# Patient Record
Sex: MALE | Race: WHITE | Employment: OTHER | URBAN - METROPOLITAN AREA
[De-identification: names, ages, dates, MRNs, and addresses within clinical notes are randomized per-mention and may not be internally consistent; named-entity substitution may affect disease eponyms.]

---

## 2017-01-17 RX ORDER — LABETALOL 100 MG/1
TABLET, FILM COATED ORAL
Qty: 180 TABLET | Refills: 3 | Status: SHIPPED | OUTPATIENT
Start: 2017-01-17 | End: 2017-11-03

## 2017-02-27 RX ORDER — AMLODIPINE BESYLATE 10 MG/1
TABLET ORAL
Qty: 90 TABLET | Refills: 3 | Status: SHIPPED | OUTPATIENT
Start: 2017-02-27 | End: 2017-11-03

## 2017-03-27 ENCOUNTER — OFFICE VISIT (OUTPATIENT)
Dept: INTERNAL MEDICINE CLINIC | Facility: CLINIC | Age: 67
End: 2017-03-27

## 2017-03-27 ENCOUNTER — LAB ENCOUNTER (OUTPATIENT)
Dept: LAB | Age: 67
End: 2017-03-27
Attending: INTERNAL MEDICINE
Payer: MEDICARE

## 2017-03-27 VITALS
WEIGHT: 230.81 LBS | BODY MASS INDEX: 34 KG/M2 | OXYGEN SATURATION: 98 % | SYSTOLIC BLOOD PRESSURE: 140 MMHG | HEART RATE: 78 BPM | DIASTOLIC BLOOD PRESSURE: 80 MMHG | TEMPERATURE: 98 F

## 2017-03-27 DIAGNOSIS — I10 HYPERTENSION, BENIGN: ICD-10-CM

## 2017-03-27 DIAGNOSIS — C61 MALIGNANT NEOPLASM OF PROSTATE (HCC): ICD-10-CM

## 2017-03-27 DIAGNOSIS — N20.0 RENAL STONE: ICD-10-CM

## 2017-03-27 DIAGNOSIS — C67.0 MALIGNANT NEOPLASM OF TRIGONE OF URINARY BLADDER (HCC): ICD-10-CM

## 2017-03-27 DIAGNOSIS — N20.0 RENAL STONE: Primary | ICD-10-CM

## 2017-03-27 LAB
ALBUMIN SERPL BCP-MCNC: 3.5 G/DL (ref 3.5–4.8)
ALBUMIN/GLOB SERPL: 1.1 {RATIO} (ref 1–2)
ALP SERPL-CCNC: 81 U/L (ref 32–100)
ALT SERPL-CCNC: 20 U/L (ref 17–63)
ANION GAP SERPL CALC-SCNC: 7 MMOL/L (ref 0–18)
AST SERPL-CCNC: 17 U/L (ref 15–41)
BASOPHILS # BLD: 0 K/UL (ref 0–0.2)
BASOPHILS NFR BLD: 0 %
BILIRUB SERPL-MCNC: 1.3 MG/DL (ref 0.3–1.2)
BUN SERPL-MCNC: 23 MG/DL (ref 8–20)
BUN/CREAT SERPL: 8 (ref 10–20)
CALCIUM SERPL-MCNC: 8.6 MG/DL (ref 8.5–10.5)
CHLORIDE SERPL-SCNC: 113 MMOL/L (ref 95–110)
CHOLEST SERPL-MCNC: 81 MG/DL (ref 110–200)
CO2 SERPL-SCNC: 19 MMOL/L (ref 22–32)
CREAT SERPL-MCNC: 2.87 MG/DL (ref 0.5–1.5)
EOSINOPHIL # BLD: 0.2 K/UL (ref 0–0.7)
EOSINOPHIL NFR BLD: 2 %
ERYTHROCYTE [DISTWIDTH] IN BLOOD BY AUTOMATED COUNT: 14.7 % (ref 11–15)
FERRITIN SERPL IA-MCNC: 19 NG/ML (ref 24–336)
GLOBULIN PLAS-MCNC: 3.3 G/DL (ref 2.5–3.7)
GLUCOSE SERPL-MCNC: 137 MG/DL (ref 70–99)
HCT VFR BLD AUTO: 38.1 % (ref 41–52)
HDLC SERPL-MCNC: 42 MG/DL
HGB BLD-MCNC: 12.4 G/DL (ref 13.5–17.5)
LDLC SERPL CALC-MCNC: 30 MG/DL (ref 0–99)
LYMPHOCYTES # BLD: 0.8 K/UL (ref 1–4)
LYMPHOCYTES NFR BLD: 10 %
MCH RBC QN AUTO: 27.6 PG (ref 27–32)
MCHC RBC AUTO-ENTMCNC: 32.6 G/DL (ref 32–37)
MCV RBC AUTO: 84.6 FL (ref 80–100)
MONOCYTES # BLD: 0.6 K/UL (ref 0–1)
MONOCYTES NFR BLD: 7 %
NEUTROPHILS # BLD AUTO: 6.4 K/UL (ref 1.8–7.7)
NEUTROPHILS NFR BLD: 81 %
NONHDLC SERPL-MCNC: 39 MG/DL
OSMOLALITY UR CALC.SUM OF ELEC: 294 MOSM/KG (ref 275–295)
PLATELET # BLD AUTO: 151 K/UL (ref 140–400)
PMV BLD AUTO: 9.3 FL (ref 7.4–10.3)
POTASSIUM SERPL-SCNC: 3.4 MMOL/L (ref 3.3–5.1)
PROT SERPL-MCNC: 6.8 G/DL (ref 5.9–8.4)
RBC # BLD AUTO: 4.51 M/UL (ref 4.5–5.9)
SODIUM SERPL-SCNC: 139 MMOL/L (ref 136–144)
TRIGL SERPL-MCNC: 47 MG/DL (ref 1–149)
TSH SERPL-ACNC: 2.02 UIU/ML (ref 0.34–5.6)
WBC # BLD AUTO: 7.9 K/UL (ref 4–11)

## 2017-03-27 PROCEDURE — 85025 COMPLETE CBC W/AUTO DIFF WBC: CPT

## 2017-03-27 PROCEDURE — 80061 LIPID PANEL: CPT

## 2017-03-27 PROCEDURE — G0463 HOSPITAL OUTPT CLINIC VISIT: HCPCS | Performed by: INTERNAL MEDICINE

## 2017-03-27 PROCEDURE — 84443 ASSAY THYROID STIM HORMONE: CPT

## 2017-03-27 PROCEDURE — 80053 COMPREHEN METABOLIC PANEL: CPT

## 2017-03-27 PROCEDURE — 36415 COLL VENOUS BLD VENIPUNCTURE: CPT

## 2017-03-27 PROCEDURE — 99214 OFFICE O/P EST MOD 30 MIN: CPT | Performed by: INTERNAL MEDICINE

## 2017-03-27 PROCEDURE — 82728 ASSAY OF FERRITIN: CPT

## 2017-03-27 NOTE — PROGRESS NOTES
Carola Summers is a 78year old male. HPI:   1. Renal stone -  He was at [facility] in Jan for re-ealuation and found to have an asymptomatic left renal stone which they extracted via nephrostomy several days.  He is doing well since that time and never did ha abdominal pain and denies heartburn  NEURO: denies headaches    EXAM:   /80 mmHg  Pulse 78  Temp(Src) 98.1 °F (36.7 °C) (Oral)  Wt 230 lb 12.8 oz (104.69 kg)  SpO2 98%  GENERAL: well developed, well nourished,in no apparent distress  SKIN: no rashes,

## 2017-03-30 ENCOUNTER — TELEPHONE (OUTPATIENT)
Dept: INTERNAL MEDICINE CLINIC | Facility: CLINIC | Age: 67
End: 2017-03-30

## 2017-03-30 NOTE — TELEPHONE ENCOUNTER
Blood count a little low iron low. Kidney function a little worse than prior - creatinine 2.8 was 2.4. He should not be taking any NSAIDs such as Advil, Aleve, Motrin, ibuprofen. Only take Tylenol for aches and pains.      Repeat CBC, Ferritin and BMP 1

## 2017-03-30 NOTE — TELEPHONE ENCOUNTER
RUBÉN to DR. BARONE - called patient and relayed DR. BARONE message - he verbalized understanding, but stated \" dave not going for blood work in a month- i know all about it \"

## 2017-09-07 ENCOUNTER — HOSPITAL ENCOUNTER (OUTPATIENT)
Dept: CT IMAGING | Facility: HOSPITAL | Age: 67
Discharge: HOME | End: 2017-09-07
Attending: UROLOGY
Payer: MEDICARE

## 2017-09-07 DIAGNOSIS — N20.0 KIDNEY STONE: ICD-10-CM

## 2017-09-07 PROCEDURE — 74176 CT ABD & PELVIS W/O CONTRAST: CPT | Performed by: UROLOGY

## 2017-09-26 ENCOUNTER — TELEPHONE (OUTPATIENT)
Dept: INTERNAL MEDICINE CLINIC | Facility: CLINIC | Age: 67
End: 2017-09-26

## 2017-09-26 DIAGNOSIS — D50.0 IRON DEFICIENCY ANEMIA DUE TO CHRONIC BLOOD LOSS: Primary | ICD-10-CM

## 2017-09-26 DIAGNOSIS — I10 BENIGN HYPERTENSION: ICD-10-CM

## 2017-09-26 NOTE — TELEPHONE ENCOUNTER
Last labs patient had done were CBC, CMP, Lipid, TSH, Ferritin on 3/27/17    To Dr Vickie Goodman do you want patient to do any labs before visit on December 6th?

## 2017-09-26 NOTE — TELEPHONE ENCOUNTER
Patient has appt to see Dr Tye Glass on Dec 6  Would like to have labs done prior to appt & requests order

## 2017-09-28 ENCOUNTER — TELEPHONE (OUTPATIENT)
Dept: SURGERY | Facility: CLINIC | Age: 67
End: 2017-09-28

## 2017-09-28 NOTE — TELEPHONE ENCOUNTER
Received progress notes from Dr. Travon Carson at [address] [place] [address]. Progress notes placed in scan bin.

## 2017-11-01 ENCOUNTER — LAB ENCOUNTER (OUTPATIENT)
Dept: LAB | Age: 67
End: 2017-11-01
Attending: INTERNAL MEDICINE
Payer: MEDICARE

## 2017-11-01 DIAGNOSIS — D50.0 IRON DEFICIENCY ANEMIA DUE TO CHRONIC BLOOD LOSS: ICD-10-CM

## 2017-11-01 DIAGNOSIS — I10 BENIGN HYPERTENSION: ICD-10-CM

## 2017-11-01 PROCEDURE — 80053 COMPREHEN METABOLIC PANEL: CPT

## 2017-11-01 PROCEDURE — 85025 COMPLETE CBC W/AUTO DIFF WBC: CPT

## 2017-11-01 PROCEDURE — 36415 COLL VENOUS BLD VENIPUNCTURE: CPT

## 2017-11-01 PROCEDURE — 82728 ASSAY OF FERRITIN: CPT

## 2017-11-03 ENCOUNTER — OFFICE VISIT (OUTPATIENT)
Dept: INTERNAL MEDICINE CLINIC | Facility: CLINIC | Age: 67
End: 2017-11-03

## 2017-11-03 VITALS
BODY MASS INDEX: 34.8 KG/M2 | OXYGEN SATURATION: 97 % | SYSTOLIC BLOOD PRESSURE: 124 MMHG | HEIGHT: 68.75 IN | DIASTOLIC BLOOD PRESSURE: 70 MMHG | HEART RATE: 71 BPM | WEIGHT: 235 LBS | TEMPERATURE: 99 F

## 2017-11-03 DIAGNOSIS — C61 MALIGNANT NEOPLASM OF PROSTATE (HCC): ICD-10-CM

## 2017-11-03 DIAGNOSIS — C67.0 MALIGNANT NEOPLASM OF TRIGONE OF URINARY BLADDER (HCC): ICD-10-CM

## 2017-11-03 DIAGNOSIS — I10 HYPERTENSION, BENIGN: Primary | ICD-10-CM

## 2017-11-03 DIAGNOSIS — N18.9 CHRONIC KIDNEY DISEASE, UNSPECIFIED CKD STAGE: ICD-10-CM

## 2017-11-03 PROCEDURE — 99214 OFFICE O/P EST MOD 30 MIN: CPT | Performed by: INTERNAL MEDICINE

## 2017-11-03 PROCEDURE — G0463 HOSPITAL OUTPT CLINIC VISIT: HCPCS | Performed by: INTERNAL MEDICINE

## 2017-11-03 RX ORDER — AMLODIPINE BESYLATE 10 MG/1
10 TABLET ORAL DAILY
Qty: 90 TABLET | Refills: 3 | Status: ON HOLD | OUTPATIENT
Start: 2017-11-03 | End: 2018-01-01

## 2017-11-03 RX ORDER — LABETALOL 100 MG/1
100 TABLET, FILM COATED ORAL 2 TIMES DAILY
Qty: 180 TABLET | Refills: 3 | Status: ON HOLD | OUTPATIENT
Start: 2017-11-03 | End: 2018-01-01

## 2017-11-03 RX ORDER — OMEPRAZOLE 20 MG/1
20 CAPSULE, DELAYED RELEASE ORAL EVERY MORNING
Qty: 90 CAPSULE | Refills: 3 | Status: ON HOLD | OUTPATIENT
Start: 2017-11-03 | End: 2018-01-01

## 2017-11-03 NOTE — PROGRESS NOTES
Derrick Caba is a 75year old male. HPI:   He is generally doing well and is stable but does have decreased energy as before not extreme - he pushes himself. He has \"no ambition\". SR: no chest pain or sob, no gu or gi sx.       1. Hypertension, joni Osteoarthritis of both knees     severe    • Unspecified essential hypertension       Social History:  Smoking status: Never Smoker                                                              Smokeless tobacco: Never Used                      Alcohol use:

## 2018-01-01 ENCOUNTER — OFFICE VISIT (OUTPATIENT)
Dept: HEMATOLOGY/ONCOLOGY | Facility: HOSPITAL | Age: 68
End: 2018-01-01
Attending: INTERNAL MEDICINE
Payer: MEDICARE

## 2018-01-01 ENCOUNTER — APPOINTMENT (OUTPATIENT)
Dept: CV DIAGNOSTICS | Facility: HOSPITAL | Age: 68
DRG: 314 | End: 2018-01-01
Attending: PHYSICIAN ASSISTANT
Payer: MEDICARE

## 2018-01-01 ENCOUNTER — HOSPITAL ENCOUNTER (INPATIENT)
Facility: HOSPITAL | Age: 68
LOS: 1 days | Discharge: ANOTHER HOSPITAL | DRG: 920 | End: 2018-01-01
Attending: EMERGENCY MEDICINE | Admitting: HOSPITALIST
Payer: MEDICARE

## 2018-01-01 ENCOUNTER — OFFICE VISIT (OUTPATIENT)
Dept: RADIATION ONCOLOGY | Facility: HOSPITAL | Age: 68
End: 2018-01-01
Attending: RADIOLOGY
Payer: MEDICARE

## 2018-01-01 ENCOUNTER — OFFICE VISIT (OUTPATIENT)
Dept: HEMATOLOGY/ONCOLOGY | Facility: HOSPITAL | Age: 68
End: 2018-01-01
Attending: PHYSICIAN ASSISTANT
Payer: MEDICARE

## 2018-01-01 ENCOUNTER — TELEPHONE (OUTPATIENT)
Dept: HEMATOLOGY/ONCOLOGY | Facility: HOSPITAL | Age: 68
End: 2018-01-01

## 2018-01-01 ENCOUNTER — TELEPHONE (OUTPATIENT)
Dept: SURGERY | Facility: CLINIC | Age: 68
End: 2018-01-01

## 2018-01-01 ENCOUNTER — APPOINTMENT (OUTPATIENT)
Dept: PICC SERVICES | Facility: HOSPITAL | Age: 68
DRG: 314 | End: 2018-01-01
Attending: PHYSICIAN ASSISTANT
Payer: MEDICARE

## 2018-01-01 ENCOUNTER — LAB ENCOUNTER (OUTPATIENT)
Dept: LAB | Age: 68
End: 2018-01-01
Attending: INTERNAL MEDICINE
Payer: MEDICARE

## 2018-01-01 ENCOUNTER — APPOINTMENT (OUTPATIENT)
Dept: GENERAL RADIOLOGY | Facility: HOSPITAL | Age: 68
DRG: 314 | End: 2018-01-01
Attending: INTERNAL MEDICINE
Payer: MEDICARE

## 2018-01-01 ENCOUNTER — HOSPITAL ENCOUNTER (EMERGENCY)
Facility: HOSPITAL | Age: 68
Discharge: HOME | End: 2018-01-01
Attending: EMERGENCY MEDICINE
Payer: MEDICARE

## 2018-01-01 ENCOUNTER — HOSPITAL ENCOUNTER (OUTPATIENT)
Dept: CT IMAGING | Facility: HOSPITAL | Age: 68
Discharge: HOME | End: 2018-01-01
Attending: ORTHOPAEDIC SURGERY
Payer: MEDICARE

## 2018-01-01 ENCOUNTER — OFFICE VISIT (OUTPATIENT)
Dept: RADIATION ONCOLOGY | Facility: HOSPITAL | Age: 68
End: 2018-01-01
Attending: INTERNAL MEDICINE
Payer: MEDICARE

## 2018-01-01 ENCOUNTER — APPOINTMENT (OUTPATIENT)
Dept: GENERAL RADIOLOGY | Facility: HOSPITAL | Age: 68
DRG: 314 | End: 2018-01-01
Attending: EMERGENCY MEDICINE
Payer: MEDICARE

## 2018-01-01 ENCOUNTER — HOSPITAL ENCOUNTER (INPATIENT)
Facility: HOSPITAL | Age: 68
LOS: 10 days | Discharge: HOME | DRG: 314 | End: 2018-01-01
Attending: EMERGENCY MEDICINE | Admitting: HOSPITALIST
Payer: MEDICARE

## 2018-01-01 ENCOUNTER — TELEPHONE (OUTPATIENT)
Dept: INTERNAL MEDICINE CLINIC | Facility: CLINIC | Age: 68
End: 2018-01-01

## 2018-01-01 ENCOUNTER — HOSPITAL ENCOUNTER (OUTPATIENT)
Dept: NUCLEAR MEDICINE | Facility: HOSPITAL | Age: 68
Discharge: HOME | DRG: 543 | End: 2018-01-01
Attending: ORTHOPAEDIC SURGERY
Payer: MEDICARE

## 2018-01-01 ENCOUNTER — NURSE ONLY (OUTPATIENT)
Dept: RADIATION ONCOLOGY | Facility: HOSPITAL | Age: 68
End: 2018-01-01

## 2018-01-01 ENCOUNTER — HOSPITAL ENCOUNTER (OUTPATIENT)
Facility: HOSPITAL | Age: 68
Setting detail: OBSERVATION
Discharge: HOME HEALTH | End: 2018-01-01
Attending: EMERGENCY MEDICINE | Admitting: HOSPITALIST
Payer: MEDICARE

## 2018-01-01 ENCOUNTER — TELEPHONE (OUTPATIENT)
Dept: PALLIATIVE CARE | Facility: HOSPITAL | Age: 68
End: 2018-01-01

## 2018-01-01 ENCOUNTER — APPOINTMENT (OUTPATIENT)
Dept: GENERAL RADIOLOGY | Facility: HOSPITAL | Age: 68
DRG: 543 | End: 2018-01-01
Payer: MEDICARE

## 2018-01-01 ENCOUNTER — APPOINTMENT (OUTPATIENT)
Dept: CT IMAGING | Facility: HOSPITAL | Age: 68
End: 2018-01-01
Attending: EMERGENCY MEDICINE
Payer: MEDICARE

## 2018-01-01 ENCOUNTER — APPOINTMENT (OUTPATIENT)
Dept: INTERVENTIONAL RADIOLOGY/VASCULAR | Facility: HOSPITAL | Age: 68
DRG: 314 | End: 2018-01-01
Attending: INTERNAL MEDICINE
Payer: MEDICARE

## 2018-01-01 ENCOUNTER — HOSPITAL ENCOUNTER (INPATIENT)
Facility: HOSPITAL | Age: 68
LOS: 2 days | Discharge: ANOTHER HOSPITAL | DRG: 543 | End: 2018-01-01
Attending: EMERGENCY MEDICINE | Admitting: HOSPITALIST
Payer: MEDICARE

## 2018-01-01 ENCOUNTER — HOSPITAL ENCOUNTER (INPATIENT)
Facility: HOSPITAL | Age: 68
LOS: 1 days | Discharge: HOME HEALTH | DRG: 812 | End: 2018-01-01
Attending: EMERGENCY MEDICINE | Admitting: HOSPITALIST
Payer: MEDICARE

## 2018-01-01 ENCOUNTER — APPOINTMENT (OUTPATIENT)
Dept: HEMATOLOGY/ONCOLOGY | Facility: HOSPITAL | Age: 68
End: 2018-01-01
Attending: NURSE PRACTITIONER
Payer: MEDICARE

## 2018-01-01 ENCOUNTER — OFFICE VISIT (OUTPATIENT)
Dept: INTERNAL MEDICINE CLINIC | Facility: CLINIC | Age: 68
End: 2018-01-01

## 2018-01-01 ENCOUNTER — HOSPITAL ENCOUNTER (OUTPATIENT)
Dept: GENERAL RADIOLOGY | Age: 68
Discharge: HOME | End: 2018-01-01
Attending: INTERNAL MEDICINE | Admitting: INTERNAL MEDICINE
Payer: MEDICARE

## 2018-01-01 ENCOUNTER — APPOINTMENT (OUTPATIENT)
Dept: INTERVENTIONAL RADIOLOGY/VASCULAR | Facility: HOSPITAL | Age: 68
DRG: 314 | End: 2018-01-01
Attending: PHYSICIAN ASSISTANT
Payer: MEDICARE

## 2018-01-01 ENCOUNTER — TELEPHONE (OUTPATIENT)
Dept: INTERNAL MEDICINE UNIT | Facility: HOSPITAL | Age: 68
End: 2018-01-01

## 2018-01-01 ENCOUNTER — APPOINTMENT (OUTPATIENT)
Dept: CT IMAGING | Facility: HOSPITAL | Age: 68
DRG: 314 | End: 2018-01-01
Attending: INTERNAL MEDICINE
Payer: MEDICARE

## 2018-01-01 ENCOUNTER — APPOINTMENT (OUTPATIENT)
Dept: CV DIAGNOSTICS | Facility: HOSPITAL | Age: 68
DRG: 314 | End: 2018-01-01
Attending: INTERNAL MEDICINE
Payer: MEDICARE

## 2018-01-01 ENCOUNTER — HOSPITAL ENCOUNTER (OUTPATIENT)
Dept: CT IMAGING | Facility: HOSPITAL | Age: 68
Discharge: HOME | End: 2018-01-01
Attending: PHYSICIAN ASSISTANT
Payer: MEDICARE

## 2018-01-01 ENCOUNTER — APPOINTMENT (OUTPATIENT)
Dept: GENERAL RADIOLOGY | Facility: HOSPITAL | Age: 68
End: 2018-01-01
Attending: EMERGENCY MEDICINE
Payer: MEDICARE

## 2018-01-01 ENCOUNTER — HOSPITAL ENCOUNTER (INPATIENT)
Facility: HOSPITAL | Age: 68
LOS: 2 days | Discharge: HOME | DRG: 683 | End: 2018-01-01
Attending: HOSPITALIST | Admitting: HOSPITALIST
Payer: MEDICARE

## 2018-01-01 VITALS
RESPIRATION RATE: 20 BRPM | WEIGHT: 216 LBS | BODY MASS INDEX: 31.99 KG/M2 | SYSTOLIC BLOOD PRESSURE: 124 MMHG | OXYGEN SATURATION: 96 % | HEART RATE: 80 BPM | TEMPERATURE: 98 F | HEIGHT: 68.75 IN | DIASTOLIC BLOOD PRESSURE: 60 MMHG

## 2018-01-01 VITALS
BODY MASS INDEX: 28.88 KG/M2 | HEART RATE: 90 BPM | DIASTOLIC BLOOD PRESSURE: 66 MMHG | HEIGHT: 69 IN | WEIGHT: 195 LBS | SYSTOLIC BLOOD PRESSURE: 137 MMHG | RESPIRATION RATE: 18 BRPM | TEMPERATURE: 98 F | OXYGEN SATURATION: 98 %

## 2018-01-01 VITALS
OXYGEN SATURATION: 97 % | HEART RATE: 91 BPM | BODY MASS INDEX: 27.03 KG/M2 | RESPIRATION RATE: 18 BRPM | WEIGHT: 182.5 LBS | DIASTOLIC BLOOD PRESSURE: 70 MMHG | TEMPERATURE: 98 F | HEIGHT: 69 IN | SYSTOLIC BLOOD PRESSURE: 146 MMHG

## 2018-01-01 VITALS
TEMPERATURE: 98 F | DIASTOLIC BLOOD PRESSURE: 67 MMHG | SYSTOLIC BLOOD PRESSURE: 148 MMHG | HEART RATE: 101 BPM | RESPIRATION RATE: 18 BRPM

## 2018-01-01 VITALS
DIASTOLIC BLOOD PRESSURE: 62 MMHG | WEIGHT: 219 LBS | TEMPERATURE: 99 F | BODY MASS INDEX: 32.44 KG/M2 | HEART RATE: 74 BPM | OXYGEN SATURATION: 98 % | HEIGHT: 69 IN | SYSTOLIC BLOOD PRESSURE: 94 MMHG

## 2018-01-01 VITALS
TEMPERATURE: 98 F | HEART RATE: 99 BPM | SYSTOLIC BLOOD PRESSURE: 129 MMHG | DIASTOLIC BLOOD PRESSURE: 76 MMHG | RESPIRATION RATE: 18 BRPM

## 2018-01-01 VITALS
BODY MASS INDEX: 31.4 KG/M2 | HEART RATE: 84 BPM | DIASTOLIC BLOOD PRESSURE: 72 MMHG | RESPIRATION RATE: 16 BRPM | SYSTOLIC BLOOD PRESSURE: 146 MMHG | OXYGEN SATURATION: 96 % | WEIGHT: 212 LBS | HEIGHT: 69 IN | TEMPERATURE: 98 F

## 2018-01-01 VITALS
TEMPERATURE: 98 F | HEART RATE: 87 BPM | HEIGHT: 69 IN | WEIGHT: 200 LBS | OXYGEN SATURATION: 100 % | BODY MASS INDEX: 29.62 KG/M2 | SYSTOLIC BLOOD PRESSURE: 106 MMHG | DIASTOLIC BLOOD PRESSURE: 58 MMHG | RESPIRATION RATE: 18 BRPM

## 2018-01-01 VITALS
HEART RATE: 85 BPM | HEIGHT: 69 IN | SYSTOLIC BLOOD PRESSURE: 132 MMHG | TEMPERATURE: 98 F | RESPIRATION RATE: 18 BRPM | WEIGHT: 186 LBS | DIASTOLIC BLOOD PRESSURE: 44 MMHG | BODY MASS INDEX: 27.55 KG/M2

## 2018-01-01 VITALS
RESPIRATION RATE: 16 BRPM | SYSTOLIC BLOOD PRESSURE: 126 MMHG | HEART RATE: 105 BPM | DIASTOLIC BLOOD PRESSURE: 64 MMHG | TEMPERATURE: 98 F

## 2018-01-01 VITALS
HEART RATE: 96 BPM | SYSTOLIC BLOOD PRESSURE: 151 MMHG | HEIGHT: 69 IN | WEIGHT: 193 LBS | BODY MASS INDEX: 28.58 KG/M2 | DIASTOLIC BLOOD PRESSURE: 73 MMHG | RESPIRATION RATE: 18 BRPM | TEMPERATURE: 98 F

## 2018-01-01 VITALS
WEIGHT: 193 LBS | BODY MASS INDEX: 28.58 KG/M2 | TEMPERATURE: 99 F | DIASTOLIC BLOOD PRESSURE: 70 MMHG | HEART RATE: 90 BPM | HEIGHT: 69 IN | SYSTOLIC BLOOD PRESSURE: 147 MMHG | RESPIRATION RATE: 16 BRPM

## 2018-01-01 VITALS
DIASTOLIC BLOOD PRESSURE: 64 MMHG | WEIGHT: 200.13 LBS | BODY MASS INDEX: 29.64 KG/M2 | OXYGEN SATURATION: 99 % | HEART RATE: 98 BPM | RESPIRATION RATE: 18 BRPM | HEIGHT: 69 IN | TEMPERATURE: 98 F | SYSTOLIC BLOOD PRESSURE: 136 MMHG

## 2018-01-01 VITALS
RESPIRATION RATE: 18 BRPM | DIASTOLIC BLOOD PRESSURE: 68 MMHG | WEIGHT: 191 LBS | HEART RATE: 85 BPM | BODY MASS INDEX: 28.29 KG/M2 | HEIGHT: 69 IN | SYSTOLIC BLOOD PRESSURE: 138 MMHG | TEMPERATURE: 98 F

## 2018-01-01 VITALS
WEIGHT: 212 LBS | OXYGEN SATURATION: 98 % | DIASTOLIC BLOOD PRESSURE: 68 MMHG | TEMPERATURE: 98 F | HEART RATE: 77 BPM | BODY MASS INDEX: 31.4 KG/M2 | HEIGHT: 69 IN | SYSTOLIC BLOOD PRESSURE: 137 MMHG | RESPIRATION RATE: 20 BRPM

## 2018-01-01 VITALS
WEIGHT: 224 LBS | BODY MASS INDEX: 33.18 KG/M2 | TEMPERATURE: 98 F | OXYGEN SATURATION: 98 % | HEART RATE: 83 BPM | HEIGHT: 68.75 IN | SYSTOLIC BLOOD PRESSURE: 110 MMHG | DIASTOLIC BLOOD PRESSURE: 68 MMHG

## 2018-01-01 VITALS
DIASTOLIC BLOOD PRESSURE: 58 MMHG | SYSTOLIC BLOOD PRESSURE: 148 MMHG | HEIGHT: 69 IN | HEART RATE: 99 BPM | TEMPERATURE: 98 F | HEIGHT: 69 IN | WEIGHT: 188 LBS | DIASTOLIC BLOOD PRESSURE: 66 MMHG | HEART RATE: 104 BPM | WEIGHT: 184 LBS | BODY MASS INDEX: 27.85 KG/M2 | TEMPERATURE: 98 F | RESPIRATION RATE: 18 BRPM | RESPIRATION RATE: 18 BRPM | BODY MASS INDEX: 27.25 KG/M2 | SYSTOLIC BLOOD PRESSURE: 128 MMHG

## 2018-01-01 VITALS
HEART RATE: 89 BPM | DIASTOLIC BLOOD PRESSURE: 64 MMHG | WEIGHT: 190 LBS | BODY MASS INDEX: 28.14 KG/M2 | TEMPERATURE: 98 F | HEIGHT: 69 IN | RESPIRATION RATE: 18 BRPM | SYSTOLIC BLOOD PRESSURE: 129 MMHG

## 2018-01-01 VITALS
WEIGHT: 214 LBS | HEIGHT: 69 IN | OXYGEN SATURATION: 99 % | SYSTOLIC BLOOD PRESSURE: 122 MMHG | BODY MASS INDEX: 31.7 KG/M2 | TEMPERATURE: 98 F | RESPIRATION RATE: 18 BRPM | HEART RATE: 87 BPM | DIASTOLIC BLOOD PRESSURE: 89 MMHG

## 2018-01-01 VITALS
HEART RATE: 94 BPM | DIASTOLIC BLOOD PRESSURE: 71 MMHG | SYSTOLIC BLOOD PRESSURE: 161 MMHG | WEIGHT: 186 LBS | RESPIRATION RATE: 16 BRPM | TEMPERATURE: 98 F | BODY MASS INDEX: 27.55 KG/M2 | HEIGHT: 69 IN

## 2018-01-01 VITALS
RESPIRATION RATE: 18 BRPM | SYSTOLIC BLOOD PRESSURE: 117 MMHG | HEART RATE: 92 BPM | TEMPERATURE: 98 F | BODY MASS INDEX: 32 KG/M2 | OXYGEN SATURATION: 99 % | WEIGHT: 216.06 LBS | DIASTOLIC BLOOD PRESSURE: 66 MMHG

## 2018-01-01 VITALS
SYSTOLIC BLOOD PRESSURE: 128 MMHG | WEIGHT: 184 LBS | TEMPERATURE: 98 F | DIASTOLIC BLOOD PRESSURE: 58 MMHG | BODY MASS INDEX: 27 KG/M2 | HEART RATE: 99 BPM | RESPIRATION RATE: 18 BRPM

## 2018-01-01 VITALS
DIASTOLIC BLOOD PRESSURE: 37 MMHG | HEIGHT: 69 IN | HEART RATE: 93 BPM | RESPIRATION RATE: 18 BRPM | BODY MASS INDEX: 27.43 KG/M2 | OXYGEN SATURATION: 97 % | TEMPERATURE: 98 F | SYSTOLIC BLOOD PRESSURE: 137 MMHG | WEIGHT: 185.19 LBS

## 2018-01-01 VITALS — DIASTOLIC BLOOD PRESSURE: 71 MMHG | SYSTOLIC BLOOD PRESSURE: 145 MMHG | HEART RATE: 83 BPM | RESPIRATION RATE: 18 BRPM

## 2018-01-01 VITALS
DIASTOLIC BLOOD PRESSURE: 74 MMHG | HEIGHT: 69 IN | TEMPERATURE: 98 F | WEIGHT: 191.81 LBS | BODY MASS INDEX: 28.41 KG/M2 | SYSTOLIC BLOOD PRESSURE: 121 MMHG | HEART RATE: 89 BPM | RESPIRATION RATE: 18 BRPM

## 2018-01-01 VITALS
BODY MASS INDEX: 28.14 KG/M2 | RESPIRATION RATE: 18 BRPM | OXYGEN SATURATION: 94 % | HEIGHT: 69 IN | SYSTOLIC BLOOD PRESSURE: 128 MMHG | TEMPERATURE: 98 F | WEIGHT: 190 LBS | DIASTOLIC BLOOD PRESSURE: 63 MMHG | HEART RATE: 81 BPM

## 2018-01-01 DIAGNOSIS — C67.9 BLADDER CANCER METASTASIZED TO BONE (HCC): ICD-10-CM

## 2018-01-01 DIAGNOSIS — D64.9 ANEMIA, UNSPECIFIED TYPE: ICD-10-CM

## 2018-01-01 DIAGNOSIS — R11.2 NAUSEA AND VOMITING, INTRACTABILITY OF VOMITING NOT SPECIFIED, UNSPECIFIED VOMITING TYPE: ICD-10-CM

## 2018-01-01 DIAGNOSIS — C67.9 MALIGNANT NEOPLASM OF URINARY BLADDER, UNSPECIFIED SITE (HCC): Primary | ICD-10-CM

## 2018-01-01 DIAGNOSIS — C67.9 MALIGNANT NEOPLASM OF URINARY BLADDER, UNSPECIFIED SITE (HCC): ICD-10-CM

## 2018-01-01 DIAGNOSIS — C79.51 BONE METASTASIS (HCC): ICD-10-CM

## 2018-01-01 DIAGNOSIS — C67.9 BLADDER CANCER (HCC): ICD-10-CM

## 2018-01-01 DIAGNOSIS — E46 HYPOALBUMINEMIA DUE TO PROTEIN-CALORIE MALNUTRITION (HCC): ICD-10-CM

## 2018-01-01 DIAGNOSIS — C79.51 BLADDER CANCER METASTASIZED TO BONE (HCC): ICD-10-CM

## 2018-01-01 DIAGNOSIS — Z00.00 ROUTINE CHECK-UP: ICD-10-CM

## 2018-01-01 DIAGNOSIS — C79.51 BONE METASTASES (HCC): ICD-10-CM

## 2018-01-01 DIAGNOSIS — M25.522 PAIN IN JOINT OF LEFT ELBOW: ICD-10-CM

## 2018-01-01 DIAGNOSIS — I10 HYPERTENSION, BENIGN: ICD-10-CM

## 2018-01-01 DIAGNOSIS — E78.00 HYPERCHOLESTEREMIA: ICD-10-CM

## 2018-01-01 DIAGNOSIS — G89.3 CANCER RELATED PAIN: ICD-10-CM

## 2018-01-01 DIAGNOSIS — T81.30XD ABDOMINAL WOUND DEHISCENCE, SUBSEQUENT ENCOUNTER: Primary | ICD-10-CM

## 2018-01-01 DIAGNOSIS — C67.9 BLADDER CANCER METASTASIZED TO BONE (HCC): Primary | ICD-10-CM

## 2018-01-01 DIAGNOSIS — G89.3 CANCER ASSOCIATED PAIN: ICD-10-CM

## 2018-01-01 DIAGNOSIS — C79.51 BONE METASTASES (HCC): Primary | ICD-10-CM

## 2018-01-01 DIAGNOSIS — C79.31 BRAIN METASTASIS (HCC): ICD-10-CM

## 2018-01-01 DIAGNOSIS — N28.9 RENAL INSUFFICIENCY: ICD-10-CM

## 2018-01-01 DIAGNOSIS — T80.90XA INFUSION REACTION, INITIAL ENCOUNTER: ICD-10-CM

## 2018-01-01 DIAGNOSIS — N17.9 ACUTE KIDNEY INJURY (HCC): ICD-10-CM

## 2018-01-01 DIAGNOSIS — N18.9 CHRONIC KIDNEY DISEASE, UNSPECIFIED CKD STAGE: ICD-10-CM

## 2018-01-01 DIAGNOSIS — Z09 CHEMOTHERAPY FOLLOW-UP EXAMINATION: ICD-10-CM

## 2018-01-01 DIAGNOSIS — R19.7 DIARRHEA, UNSPECIFIED TYPE: ICD-10-CM

## 2018-01-01 DIAGNOSIS — R68.89 RIGORS: ICD-10-CM

## 2018-01-01 DIAGNOSIS — C67.0 MALIGNANT NEOPLASM OF TRIGONE OF URINARY BLADDER (HCC): ICD-10-CM

## 2018-01-01 DIAGNOSIS — C79.51 PAIN FROM BONE METASTASES (HCC): Primary | ICD-10-CM

## 2018-01-01 DIAGNOSIS — R25.1 TREMOR OF LEFT HAND: ICD-10-CM

## 2018-01-01 DIAGNOSIS — C67.0 MALIGNANT NEOPLASM OF TRIGONE OF URINARY BLADDER (HCC): Primary | ICD-10-CM

## 2018-01-01 DIAGNOSIS — C79.51 BLADDER CANCER METASTASIZED TO BONE (HCC): Primary | ICD-10-CM

## 2018-01-01 DIAGNOSIS — G89.29 CHRONIC KNEE PAIN AFTER TOTAL REPLACEMENT OF RIGHT KNEE JOINT: ICD-10-CM

## 2018-01-01 DIAGNOSIS — R53.81 MALAISE: ICD-10-CM

## 2018-01-01 DIAGNOSIS — C44.519 BASAL CELL CARCINOMA (BCC) OF BACK: Primary | ICD-10-CM

## 2018-01-01 DIAGNOSIS — K92.2 GASTROINTESTINAL HEMORRHAGE, UNSPECIFIED GASTROINTESTINAL HEMORRHAGE TYPE: Primary | ICD-10-CM

## 2018-01-01 DIAGNOSIS — E88.09 HYPOALBUMINEMIA DUE TO PROTEIN-CALORIE MALNUTRITION (HCC): ICD-10-CM

## 2018-01-01 DIAGNOSIS — Z85.51 HISTORY OF BLADDER CANCER: ICD-10-CM

## 2018-01-01 DIAGNOSIS — Z51.11 CHEMOTHERAPY MANAGEMENT, ENCOUNTER FOR: ICD-10-CM

## 2018-01-01 DIAGNOSIS — R11.2 INTRACTABLE VOMITING WITH NAUSEA, UNSPECIFIED VOMITING TYPE: Primary | ICD-10-CM

## 2018-01-01 DIAGNOSIS — C79.31 BRAIN METASTASIS (HCC): Primary | ICD-10-CM

## 2018-01-01 DIAGNOSIS — I10 HYPERTENSION, UNSPECIFIED TYPE: ICD-10-CM

## 2018-01-01 DIAGNOSIS — M84.651A: ICD-10-CM

## 2018-01-01 DIAGNOSIS — T81.30XA ABDOMINAL WOUND DEHISCENCE, INITIAL ENCOUNTER: Primary | ICD-10-CM

## 2018-01-01 DIAGNOSIS — R89.9 ABNORMAL LABORATORY TEST RESULT: Primary | ICD-10-CM

## 2018-01-01 DIAGNOSIS — K63.2 ENTEROCUTANEOUS FISTULA: ICD-10-CM

## 2018-01-01 DIAGNOSIS — R11.2 NAUSEA AND VOMITING, INTRACTABILITY OF VOMITING NOT SPECIFIED, UNSPECIFIED VOMITING TYPE: Primary | ICD-10-CM

## 2018-01-01 DIAGNOSIS — N39.0 SEPSIS DUE TO URINARY TRACT INFECTION (HCC): Primary | ICD-10-CM

## 2018-01-01 DIAGNOSIS — N30.00 ACUTE CYSTITIS WITHOUT HEMATURIA: Primary | ICD-10-CM

## 2018-01-01 DIAGNOSIS — Z46.6 ENCOUNTER FOR FOLEY CATHETER REMOVAL: Primary | ICD-10-CM

## 2018-01-01 DIAGNOSIS — D50.8 OTHER IRON DEFICIENCY ANEMIA: ICD-10-CM

## 2018-01-01 DIAGNOSIS — M25.561 CHRONIC KNEE PAIN AFTER TOTAL REPLACEMENT OF RIGHT KNEE JOINT: ICD-10-CM

## 2018-01-01 DIAGNOSIS — N18.9 CHRONIC KIDNEY DISEASE, UNSPECIFIED CKD STAGE: Primary | ICD-10-CM

## 2018-01-01 DIAGNOSIS — A41.9 SEPSIS DUE TO URINARY TRACT INFECTION (HCC): Primary | ICD-10-CM

## 2018-01-01 DIAGNOSIS — Z96.651 CHRONIC KNEE PAIN AFTER TOTAL REPLACEMENT OF RIGHT KNEE JOINT: ICD-10-CM

## 2018-01-01 DIAGNOSIS — R63.30 FEEDING DIFFICULTIES: ICD-10-CM

## 2018-01-01 DIAGNOSIS — C79.51 BONE METASTASIS (HCC): Primary | ICD-10-CM

## 2018-01-01 DIAGNOSIS — R33.9 URINARY RETENTION: Primary | ICD-10-CM

## 2018-01-01 DIAGNOSIS — G89.3 PAIN FROM BONE METASTASES (HCC): Primary | ICD-10-CM

## 2018-01-01 DIAGNOSIS — I10 HYPERTENSION, UNSPECIFIED TYPE: Primary | ICD-10-CM

## 2018-01-01 LAB
ALBUMIN SERPL BCP-MCNC: 3.5 G/DL (ref 3.5–4.8)
ALBUMIN/GLOB SERPL: 1 {RATIO} (ref 1–2)
ALP SERPL-CCNC: 151 U/L (ref 32–100)
ALT SERPL-CCNC: 19 U/L (ref 17–63)
ANION GAP SERPL CALC-SCNC: 5 MMOL/L (ref 0–18)
ANION GAP SERPL CALC-SCNC: 5 MMOL/L (ref 0–18)
ANION GAP SERPL CALC-SCNC: 7 MMOL/L (ref 0–18)
ANION GAP SERPL CALC-SCNC: 8 MMOL/L (ref 0–18)
AST SERPL-CCNC: 16 U/L (ref 15–41)
BASOPHILS # BLD: 0 K/UL (ref 0–0.2)
BASOPHILS NFR BLD: 0 %
BASOPHILS NFR BLD: 1 %
BILIRUB SERPL-MCNC: 1.2 MG/DL (ref 0.3–1.2)
BUN SERPL-MCNC: 19 MG/DL (ref 8–20)
BUN SERPL-MCNC: 23 MG/DL (ref 8–20)
BUN SERPL-MCNC: 24 MG/DL (ref 8–20)
BUN SERPL-MCNC: 25 MG/DL (ref 8–20)
BUN SERPL-MCNC: 36 MG/DL (ref 8–20)
BUN SERPL-MCNC: 37 MG/DL (ref 8–20)
BUN/CREAT SERPL: 10.5 (ref 10–20)
BUN/CREAT SERPL: 13.8 (ref 10–20)
BUN/CREAT SERPL: 15.2 (ref 10–20)
BUN/CREAT SERPL: 6.9 (ref 10–20)
BUN/CREAT SERPL: 9.7 (ref 10–20)
BUN/CREAT SERPL: 9.8 (ref 10–20)
CALCIUM SERPL-MCNC: 7.7 MG/DL (ref 8.5–10.5)
CALCIUM SERPL-MCNC: 7.7 MG/DL (ref 8.5–10.5)
CALCIUM SERPL-MCNC: 8 MG/DL (ref 8.5–10.5)
CALCIUM SERPL-MCNC: 8.4 MG/DL (ref 8.5–10.5)
CALCIUM SERPL-MCNC: 8.5 MG/DL (ref 8.5–10.5)
CALCIUM SERPL-MCNC: 8.6 MG/DL (ref 8.5–10.5)
CHLORIDE SERPL-SCNC: 111 MMOL/L (ref 95–110)
CHLORIDE SERPL-SCNC: 111 MMOL/L (ref 95–110)
CHLORIDE SERPL-SCNC: 113 MMOL/L (ref 95–110)
CHLORIDE SERPL-SCNC: 113 MMOL/L (ref 95–110)
CHLORIDE SERPL-SCNC: 115 MMOL/L (ref 95–110)
CHLORIDE SERPL-SCNC: 117 MMOL/L (ref 95–110)
CHOLEST SERPL-MCNC: 69 MG/DL (ref 110–200)
CO2 SERPL-SCNC: 17 MMOL/L (ref 22–32)
CO2 SERPL-SCNC: 18 MMOL/L (ref 22–32)
CO2 SERPL-SCNC: 19 MMOL/L (ref 22–32)
CO2 SERPL-SCNC: 20 MMOL/L (ref 22–32)
CO2 SERPL-SCNC: 20 MMOL/L (ref 22–32)
CO2 SERPL-SCNC: 22 MMOL/L (ref 22–32)
CREAT SERPL-MCNC: 2.35 MG/DL (ref 0.5–1.5)
CREAT SERPL-MCNC: 2.37 MG/DL (ref 0.5–1.5)
CREAT SERPL-MCNC: 2.43 MG/DL (ref 0.5–1.5)
CREAT SERPL-MCNC: 2.48 MG/DL (ref 0.5–1.5)
CREAT SERPL-MCNC: 2.6 MG/DL (ref 0.5–1.5)
CREAT SERPL-MCNC: 2.75 MG/DL (ref 0.5–1.5)
EOSINOPHIL # BLD: 0.1 K/UL (ref 0–0.7)
EOSINOPHIL # BLD: 0.1 K/UL (ref 0–0.7)
EOSINOPHIL # BLD: 0.2 K/UL (ref 0–0.7)
EOSINOPHIL # BLD: 0.4 K/UL (ref 0–0.7)
EOSINOPHIL NFR BLD: 2 %
EOSINOPHIL NFR BLD: 3 %
EOSINOPHIL NFR BLD: 4 %
EOSINOPHIL NFR BLD: 6 %
ERYTHROCYTE [DISTWIDTH] IN BLOOD BY AUTOMATED COUNT: 14.3 % (ref 11–15)
ERYTHROCYTE [DISTWIDTH] IN BLOOD BY AUTOMATED COUNT: 15.6 % (ref 11–15)
ERYTHROCYTE [DISTWIDTH] IN BLOOD BY AUTOMATED COUNT: 15.8 % (ref 11–15)
ERYTHROCYTE [DISTWIDTH] IN BLOOD BY AUTOMATED COUNT: 15.8 % (ref 11–15)
ERYTHROCYTE [DISTWIDTH] IN BLOOD BY AUTOMATED COUNT: 16.1 % (ref 11–15)
ERYTHROCYTE [DISTWIDTH] IN BLOOD BY AUTOMATED COUNT: 16.2 % (ref 11–15)
FERRITIN SERPL IA-MCNC: 28 NG/ML (ref 24–336)
GLOBULIN PLAS-MCNC: 3.4 G/DL (ref 2.5–3.7)
GLUCOSE SERPL-MCNC: 107 MG/DL (ref 70–99)
GLUCOSE SERPL-MCNC: 115 MG/DL (ref 70–99)
GLUCOSE SERPL-MCNC: 122 MG/DL (ref 70–99)
GLUCOSE SERPL-MCNC: 157 MG/DL (ref 70–99)
GLUCOSE SERPL-MCNC: 160 MG/DL (ref 70–99)
GLUCOSE SERPL-MCNC: 169 MG/DL (ref 70–99)
HCT VFR BLD AUTO: 23.8 % (ref 41–52)
HCT VFR BLD AUTO: 24.7 % (ref 41–52)
HCT VFR BLD AUTO: 25.2 % (ref 41–52)
HCT VFR BLD AUTO: 25.4 % (ref 41–52)
HCT VFR BLD AUTO: 28.1 % (ref 41–52)
HCT VFR BLD AUTO: 35.3 % (ref 41–52)
HCT VFR BLD AUTO: 38 % (ref 41–52)
HCT VFR BLD AUTO: 40.7 % (ref 41–52)
HDLC SERPL-MCNC: 38 MG/DL
HGB BLD-MCNC: 11.4 G/DL (ref 13.5–17.5)
HGB BLD-MCNC: 12.4 G/DL (ref 13.5–17.5)
HGB BLD-MCNC: 13.4 G/DL (ref 13.5–17.5)
HGB BLD-MCNC: 7.4 G/DL (ref 13.5–17.5)
HGB BLD-MCNC: 7.9 G/DL (ref 13.5–17.5)
HGB BLD-MCNC: 8 G/DL (ref 13.5–17.5)
HGB BLD-MCNC: 8 G/DL (ref 13.5–17.5)
HGB BLD-MCNC: 8.9 G/DL (ref 13.5–17.5)
IRON SATN MFR SERPL: 7 % (ref 20–55)
IRON SERPL-MCNC: 15 MCG/DL (ref 45–182)
LACTATE SERPL-SCNC: 1.6 MMOL/L (ref 0.5–2.2)
LDLC SERPL CALC-MCNC: 23 MG/DL (ref 0–99)
LYMPHOCYTES # BLD: 0.5 K/UL (ref 1–4)
LYMPHOCYTES # BLD: 0.6 K/UL (ref 1–4)
LYMPHOCYTES # BLD: 0.6 K/UL (ref 1–4)
LYMPHOCYTES # BLD: 0.7 K/UL (ref 1–4)
LYMPHOCYTES NFR BLD: 10 %
LYMPHOCYTES NFR BLD: 10 %
LYMPHOCYTES NFR BLD: 14 %
LYMPHOCYTES NFR BLD: 16 %
LYMPHOCYTES NFR BLD: 23 %
LYMPHOCYTES NFR BLD: 5 %
MCH RBC QN AUTO: 26.1 PG (ref 27–32)
MCH RBC QN AUTO: 26.2 PG (ref 27–32)
MCH RBC QN AUTO: 26.3 PG (ref 27–32)
MCH RBC QN AUTO: 27.6 PG (ref 27–32)
MCH RBC QN AUTO: 27.7 PG (ref 27–32)
MCH RBC QN AUTO: 27.9 PG (ref 27–32)
MCHC RBC AUTO-ENTMCNC: 31.6 G/DL (ref 32–37)
MCHC RBC AUTO-ENTMCNC: 31.8 G/DL (ref 32–37)
MCHC RBC AUTO-ENTMCNC: 32.2 G/DL (ref 32–37)
MCHC RBC AUTO-ENTMCNC: 32.2 G/DL (ref 32–37)
MCHC RBC AUTO-ENTMCNC: 32.5 G/DL (ref 32–37)
MCHC RBC AUTO-ENTMCNC: 33 G/DL (ref 32–37)
MCV RBC AUTO: 81.5 FL (ref 80–100)
MCV RBC AUTO: 82.3 FL (ref 80–100)
MCV RBC AUTO: 83.1 FL (ref 80–100)
MCV RBC AUTO: 84.5 FL (ref 80–100)
MCV RBC AUTO: 85.3 FL (ref 80–100)
MCV RBC AUTO: 85.6 FL (ref 80–100)
MONOCYTES # BLD: 0.3 K/UL (ref 0–1)
MONOCYTES # BLD: 0.4 K/UL (ref 0–1)
MONOCYTES # BLD: 0.5 K/UL (ref 0–1)
MONOCYTES # BLD: 0.7 K/UL (ref 0–1)
MONOCYTES NFR BLD: 10 %
MONOCYTES NFR BLD: 11 %
MONOCYTES NFR BLD: 6 %
MONOCYTES NFR BLD: 8 %
NEUTROPHILS # BLD AUTO: 1.7 K/UL (ref 1.8–7.7)
NEUTROPHILS # BLD AUTO: 2.6 K/UL (ref 1.8–7.7)
NEUTROPHILS # BLD AUTO: 3.4 K/UL (ref 1.8–7.7)
NEUTROPHILS # BLD AUTO: 5.2 K/UL (ref 1.8–7.7)
NEUTROPHILS # BLD AUTO: 5.2 K/UL (ref 1.8–7.7)
NEUTROPHILS # BLD AUTO: 8.3 K/UL (ref 1.8–7.7)
NEUTROPHILS NFR BLD: 61 %
NEUTROPHILS NFR BLD: 70 %
NEUTROPHILS NFR BLD: 71 %
NEUTROPHILS NFR BLD: 73 %
NEUTROPHILS NFR BLD: 78 %
NEUTROPHILS NFR BLD: 87 %
NONHDLC SERPL-MCNC: 31 MG/DL
OSMOLALITY UR CALC.SUM OF ELEC: 294 MOSM/KG (ref 275–295)
OSMOLALITY UR CALC.SUM OF ELEC: 295 MOSM/KG (ref 275–295)
OSMOLALITY UR CALC.SUM OF ELEC: 295 MOSM/KG (ref 275–295)
OSMOLALITY UR CALC.SUM OF ELEC: 298 MOSM/KG (ref 275–295)
OSMOLALITY UR CALC.SUM OF ELEC: 298 MOSM/KG (ref 275–295)
OSMOLALITY UR CALC.SUM OF ELEC: 299 MOSM/KG (ref 275–295)
PATIENT FASTING: YES
PLATELET # BLD AUTO: 116 K/UL (ref 140–400)
PLATELET # BLD AUTO: 116 K/UL (ref 140–400)
PLATELET # BLD AUTO: 142 K/UL (ref 140–400)
PLATELET # BLD AUTO: 59 K/UL (ref 140–400)
PLATELET # BLD AUTO: 63 K/UL (ref 140–400)
PLATELET # BLD AUTO: 72 K/UL (ref 140–400)
PMV BLD AUTO: 8.1 FL (ref 7.4–10.3)
PMV BLD AUTO: 8.3 FL (ref 7.4–10.3)
PMV BLD AUTO: 8.4 FL (ref 7.4–10.3)
PMV BLD AUTO: 8.5 FL (ref 7.4–10.3)
PMV BLD AUTO: 8.8 FL (ref 7.4–10.3)
PMV BLD AUTO: 8.9 FL (ref 7.4–10.3)
POTASSIUM SERPL-SCNC: 3 MMOL/L (ref 3.3–5.1)
POTASSIUM SERPL-SCNC: 3.3 MMOL/L (ref 3.3–5.1)
POTASSIUM SERPL-SCNC: 3.6 MMOL/L (ref 3.3–5.1)
POTASSIUM SERPL-SCNC: 3.7 MMOL/L (ref 3.3–5.1)
POTASSIUM SERPL-SCNC: 3.8 MMOL/L (ref 3.3–5.1)
POTASSIUM SERPL-SCNC: 3.8 MMOL/L (ref 3.3–5.1)
POTASSIUM SERPL-SCNC: 4 MMOL/L (ref 3.3–5.1)
POTASSIUM SERPL-SCNC: 4.2 MMOL/L (ref 3.3–5.1)
PROT SERPL-MCNC: 6.9 G/DL (ref 5.9–8.4)
PSA SERPL-MCNC: 0 NG/ML (ref 0–4)
RBC # BLD AUTO: 3.03 M/UL (ref 4.5–5.9)
RBC # BLD AUTO: 3.05 M/UL (ref 4.5–5.9)
RBC # BLD AUTO: 3.41 M/UL (ref 4.5–5.9)
RBC # BLD AUTO: 4.13 M/UL (ref 4.5–5.9)
RBC # BLD AUTO: 4.46 M/UL (ref 4.5–5.9)
RBC # BLD AUTO: 4.82 M/UL (ref 4.5–5.9)
SODIUM SERPL-SCNC: 136 MMOL/L (ref 136–144)
SODIUM SERPL-SCNC: 138 MMOL/L (ref 136–144)
SODIUM SERPL-SCNC: 140 MMOL/L (ref 136–144)
SODIUM SERPL-SCNC: 140 MMOL/L (ref 136–144)
SODIUM SERPL-SCNC: 141 MMOL/L (ref 136–144)
SODIUM SERPL-SCNC: 142 MMOL/L (ref 136–144)
TIBC SERPL-MCNC: 203 MCG/DL (ref 228–428)
TRANSFERRIN SERPL-MCNC: 154 MG/DL (ref 180–329)
TRIGL SERPL-MCNC: 39 MG/DL (ref 1–149)
TSH SERPL-ACNC: 1.8 UIU/ML (ref 0.45–5.33)
WBC # BLD AUTO: 2.9 K/UL (ref 4–11)
WBC # BLD AUTO: 3.8 K/UL (ref 4–11)
WBC # BLD AUTO: 4.7 K/UL (ref 4–11)
WBC # BLD AUTO: 6.7 K/UL (ref 4–11)
WBC # BLD AUTO: 7.1 K/UL (ref 4–11)
WBC # BLD AUTO: 9.6 K/UL (ref 4–11)

## 2018-01-01 PROCEDURE — 99223 1ST HOSP IP/OBS HIGH 75: CPT | Performed by: NURSE PRACTITIONER

## 2018-01-01 PROCEDURE — 99214 OFFICE O/P EST MOD 30 MIN: CPT | Performed by: INTERNAL MEDICINE

## 2018-01-01 PROCEDURE — 84100 ASSAY OF PHOSPHORUS: CPT

## 2018-01-01 PROCEDURE — 99232 SBSQ HOSP IP/OBS MODERATE 35: CPT | Performed by: SURGERY

## 2018-01-01 PROCEDURE — 99213 OFFICE O/P EST LOW 20 MIN: CPT | Performed by: NURSE PRACTITIONER

## 2018-01-01 PROCEDURE — 80053 COMPREHEN METABOLIC PANEL: CPT

## 2018-01-01 PROCEDURE — 99232 SBSQ HOSP IP/OBS MODERATE 35: CPT | Performed by: INTERNAL MEDICINE

## 2018-01-01 PROCEDURE — 99223 1ST HOSP IP/OBS HIGH 75: CPT | Performed by: HOSPITALIST

## 2018-01-01 PROCEDURE — 99233 SBSQ HOSP IP/OBS HIGH 50: CPT | Performed by: INTERNAL MEDICINE

## 2018-01-01 PROCEDURE — 99223 1ST HOSP IP/OBS HIGH 75: CPT | Performed by: INTERNAL MEDICINE

## 2018-01-01 PROCEDURE — 87186 SC STD MICRODIL/AGAR DIL: CPT | Performed by: EMERGENCY MEDICINE

## 2018-01-01 PROCEDURE — 99214 OFFICE O/P EST MOD 30 MIN: CPT | Performed by: NURSE PRACTITIONER

## 2018-01-01 PROCEDURE — 51702 INSERT TEMP BLADDER CATH: CPT

## 2018-01-01 PROCEDURE — 77387 GUIDANCE FOR RADJ TX DLVR: CPT | Performed by: RADIOLOGY

## 2018-01-01 PROCEDURE — 99222 1ST HOSP IP/OBS MODERATE 55: CPT | Performed by: UROLOGY

## 2018-01-01 PROCEDURE — 96375 TX/PRO/DX INJ NEW DRUG ADDON: CPT

## 2018-01-01 PROCEDURE — 85025 COMPLETE CBC W/AUTO DIFF WBC: CPT

## 2018-01-01 PROCEDURE — 87086 URINE CULTURE/COLONY COUNT: CPT

## 2018-01-01 PROCEDURE — 77412 RADIATION TX DELIVERY LVL 3: CPT | Performed by: RADIOLOGY

## 2018-01-01 PROCEDURE — 85025 COMPLETE CBC W/AUTO DIFF WBC: CPT | Performed by: EMERGENCY MEDICINE

## 2018-01-01 PROCEDURE — 99233 SBSQ HOSP IP/OBS HIGH 50: CPT | Performed by: HOSPITALIST

## 2018-01-01 PROCEDURE — 77334 RADIATION TREATMENT AID(S): CPT | Performed by: RADIOLOGY

## 2018-01-01 PROCEDURE — 96413 CHEMO IV INFUSION 1 HR: CPT

## 2018-01-01 PROCEDURE — 82728 ASSAY OF FERRITIN: CPT

## 2018-01-01 PROCEDURE — 99211 OFF/OP EST MAY X REQ PHY/QHP: CPT

## 2018-01-01 PROCEDURE — 83735 ASSAY OF MAGNESIUM: CPT

## 2018-01-01 PROCEDURE — 74176 CT ABD & PELVIS W/O CONTRAST: CPT | Performed by: EMERGENCY MEDICINE

## 2018-01-01 PROCEDURE — G0463 HOSPITAL OUTPT CLINIC VISIT: HCPCS | Performed by: INTERNAL MEDICINE

## 2018-01-01 PROCEDURE — 77336 RADIATION PHYSICS CONSULT: CPT | Performed by: RADIOLOGY

## 2018-01-01 PROCEDURE — 99222 1ST HOSP IP/OBS MODERATE 55: CPT | Performed by: HOSPITALIST

## 2018-01-01 PROCEDURE — 71046 X-RAY EXAM CHEST 2 VIEWS: CPT | Performed by: INTERNAL MEDICINE

## 2018-01-01 PROCEDURE — 99220 INITIAL OBSERVATION CARE,LEVL III: CPT | Performed by: HOSPITALIST

## 2018-01-01 PROCEDURE — 99222 1ST HOSP IP/OBS MODERATE 55: CPT | Performed by: SURGERY

## 2018-01-01 PROCEDURE — 80048 BASIC METABOLIC PNL TOTAL CA: CPT

## 2018-01-01 PROCEDURE — 77295 3-D RADIOTHERAPY PLAN: CPT | Performed by: RADIOLOGY

## 2018-01-01 PROCEDURE — 96417 CHEMO IV INFUS EACH ADDL SEQ: CPT

## 2018-01-01 PROCEDURE — 99238 HOSP IP/OBS DSCHRG MGMT 30/<: CPT | Performed by: HOSPITALIST

## 2018-01-01 PROCEDURE — 02HV33Z INSERTION OF INFUSION DEVICE INTO SUPERIOR VENA CAVA, PERCUTANEOUS APPROACH: ICD-10-PCS | Performed by: HOSPITALIST

## 2018-01-01 PROCEDURE — 99215 OFFICE O/P EST HI 40 MIN: CPT | Performed by: INTERNAL MEDICINE

## 2018-01-01 PROCEDURE — 99212 OFFICE O/P EST SF 10 MIN: CPT

## 2018-01-01 PROCEDURE — 78315 BONE IMAGING 3 PHASE: CPT | Performed by: ORTHOPAEDIC SURGERY

## 2018-01-01 PROCEDURE — 99291 CRITICAL CARE FIRST HOUR: CPT | Performed by: HOSPITALIST

## 2018-01-01 PROCEDURE — 87086 URINE CULTURE/COLONY COUNT: CPT | Performed by: EMERGENCY MEDICINE

## 2018-01-01 PROCEDURE — 36415 COLL VENOUS BLD VENIPUNCTURE: CPT

## 2018-01-01 PROCEDURE — 77280 THER RAD SIMULAJ FIELD SMPL: CPT | Performed by: RADIOLOGY

## 2018-01-01 PROCEDURE — 99231 SBSQ HOSP IP/OBS SF/LOW 25: CPT | Performed by: SURGERY

## 2018-01-01 PROCEDURE — 96361 HYDRATE IV INFUSION ADD-ON: CPT

## 2018-01-01 PROCEDURE — B246ZZ4 ULTRASONOGRAPHY OF RIGHT AND LEFT HEART, TRANSESOPHAGEAL: ICD-10-PCS | Performed by: INTERNAL MEDICINE

## 2018-01-01 PROCEDURE — 81001 URINALYSIS AUTO W/SCOPE: CPT | Performed by: EMERGENCY MEDICINE

## 2018-01-01 PROCEDURE — 77331 SPECIAL RADIATION DOSIMETRY: CPT | Performed by: RADIOLOGY

## 2018-01-01 PROCEDURE — 71046 X-RAY EXAM CHEST 2 VIEWS: CPT | Performed by: EMERGENCY MEDICINE

## 2018-01-01 PROCEDURE — 99232 SBSQ HOSP IP/OBS MODERATE 35: CPT | Performed by: NURSE PRACTITIONER

## 2018-01-01 PROCEDURE — 99291 CRITICAL CARE FIRST HOUR: CPT | Performed by: INTERNAL MEDICINE

## 2018-01-01 PROCEDURE — 73200 CT UPPER EXTREMITY W/O DYE: CPT | Performed by: ORTHOPAEDIC SURGERY

## 2018-01-01 PROCEDURE — 3E0336Z INTRODUCTION OF NUTRITIONAL SUBSTANCE INTO PERIPHERAL VEIN, PERCUTANEOUS APPROACH: ICD-10-PCS | Performed by: HOSPITALIST

## 2018-01-01 PROCEDURE — 30233N1 TRANSFUSION OF NONAUTOLOGOUS RED BLOOD CELLS INTO PERIPHERAL VEIN, PERCUTANEOUS APPROACH: ICD-10-PCS | Performed by: EMERGENCY MEDICINE

## 2018-01-01 PROCEDURE — 80048 BASIC METABOLIC PNL TOTAL CA: CPT | Performed by: EMERGENCY MEDICINE

## 2018-01-01 PROCEDURE — 71045 X-RAY EXAM CHEST 1 VIEW: CPT | Performed by: INTERNAL MEDICINE

## 2018-01-01 PROCEDURE — 99283 EMERGENCY DEPT VISIT LOW MDM: CPT

## 2018-01-01 PROCEDURE — A4216 STERILE WATER/SALINE, 10 ML: HCPCS

## 2018-01-01 PROCEDURE — 81001 URINALYSIS AUTO W/SCOPE: CPT

## 2018-01-01 PROCEDURE — 77290 THER RAD SIMULAJ FIELD CPLX: CPT | Performed by: RADIOLOGY

## 2018-01-01 PROCEDURE — 87077 CULTURE AEROBIC IDENTIFY: CPT | Performed by: EMERGENCY MEDICINE

## 2018-01-01 PROCEDURE — 99215 OFFICE O/P EST HI 40 MIN: CPT | Performed by: PHYSICIAN ASSISTANT

## 2018-01-01 PROCEDURE — 71250 CT THORAX DX C-: CPT | Performed by: PHYSICIAN ASSISTANT

## 2018-01-01 PROCEDURE — 74176 CT ABD & PELVIS W/O CONTRAST: CPT | Performed by: INTERNAL MEDICINE

## 2018-01-01 PROCEDURE — 93325 DOPPLER ECHO COLOR FLOW MAPG: CPT | Performed by: INTERNAL MEDICINE

## 2018-01-01 PROCEDURE — 99284 EMERGENCY DEPT VISIT MOD MDM: CPT

## 2018-01-01 PROCEDURE — 93306 TTE W/DOPPLER COMPLETE: CPT | Performed by: PHYSICIAN ASSISTANT

## 2018-01-01 PROCEDURE — 84443 ASSAY THYROID STIM HORMONE: CPT

## 2018-01-01 PROCEDURE — 99213 OFFICE O/P EST LOW 20 MIN: CPT | Performed by: INTERNAL MEDICINE

## 2018-01-01 PROCEDURE — 30233N1 TRANSFUSION OF NONAUTOLOGOUS RED BLOOD CELLS INTO PERIPHERAL VEIN, PERCUTANEOUS APPROACH: ICD-10-PCS | Performed by: HOSPITALIST

## 2018-01-01 PROCEDURE — 73560 X-RAY EXAM OF KNEE 1 OR 2: CPT | Performed by: EMERGENCY MEDICINE

## 2018-01-01 PROCEDURE — 02PYX3Z REMOVAL OF INFUSION DEVICE FROM GREAT VESSEL, EXTERNAL APPROACH: ICD-10-PCS | Performed by: RADIOLOGY

## 2018-01-01 PROCEDURE — 77300 RADIATION THERAPY DOSE PLAN: CPT | Performed by: RADIOLOGY

## 2018-01-01 PROCEDURE — 96365 THER/PROPH/DIAG IV INF INIT: CPT

## 2018-01-01 PROCEDURE — 99215 OFFICE O/P EST HI 40 MIN: CPT | Performed by: NURSE PRACTITIONER

## 2018-01-01 PROCEDURE — 99214 OFFICE O/P EST MOD 30 MIN: CPT | Performed by: PHYSICIAN ASSISTANT

## 2018-01-01 PROCEDURE — 99222 1ST HOSP IP/OBS MODERATE 55: CPT | Performed by: INTERNAL MEDICINE

## 2018-01-01 PROCEDURE — 74176 CT ABD & PELVIS W/O CONTRAST: CPT | Performed by: PHYSICIAN ASSISTANT

## 2018-01-01 PROCEDURE — 99239 HOSP IP/OBS DSCHRG MGMT >30: CPT | Performed by: HOSPITALIST

## 2018-01-01 PROCEDURE — 99217 OBSERVATION CARE DISCHARGE: CPT | Performed by: HOSPITALIST

## 2018-01-01 PROCEDURE — 71045 X-RAY EXAM CHEST 1 VIEW: CPT | Performed by: EMERGENCY MEDICINE

## 2018-01-01 PROCEDURE — 93320 DOPPLER ECHO COMPLETE: CPT | Performed by: INTERNAL MEDICINE

## 2018-01-01 PROCEDURE — 80061 LIPID PANEL: CPT

## 2018-01-01 RX ORDER — SODIUM CHLORIDE 0.9 % (FLUSH) 0.9 %
3 SYRINGE (ML) INJECTION AS NEEDED
Status: DISCONTINUED | OUTPATIENT
Start: 2018-01-01 | End: 2018-01-01

## 2018-01-01 RX ORDER — 0.9 % SODIUM CHLORIDE 0.9 %
VIAL (ML) INJECTION
Status: DISCONTINUED
Start: 2018-01-01 | End: 2018-01-01

## 2018-01-01 RX ORDER — SODIUM CHLORIDE 0.9 % (FLUSH) 0.9 %
10 SYRINGE (ML) INJECTION AS NEEDED
Status: DISCONTINUED | OUTPATIENT
Start: 2018-01-01 | End: 2018-01-01

## 2018-01-01 RX ORDER — MORPHINE SULFATE 2 MG/ML
2 INJECTION, SOLUTION INTRAMUSCULAR; INTRAVENOUS EVERY 2 HOUR PRN
Status: DISCONTINUED | OUTPATIENT
Start: 2018-01-01 | End: 2018-01-01

## 2018-01-01 RX ORDER — LOPERAMIDE HYDROCHLORIDE 2 MG/1
4 CAPSULE ORAL 4 TIMES DAILY
Qty: 240 CAPSULE | Refills: 0 | Status: SHIPPED | OUTPATIENT
Start: 2018-01-01 | End: 2019-01-01

## 2018-01-01 RX ORDER — OXYCODONE AND ACETAMINOPHEN 10; 325 MG/1; MG/1
1 TABLET ORAL EVERY 6 HOURS PRN
Status: ON HOLD | COMMUNITY
End: 2018-01-01

## 2018-01-01 RX ORDER — 0.9 % SODIUM CHLORIDE 0.9 %
VIAL (ML) INJECTION
Status: COMPLETED
Start: 2018-01-01 | End: 2018-01-01

## 2018-01-01 RX ORDER — SODIUM CHLORIDE 9 MG/ML
1000 INJECTION, SOLUTION INTRAVENOUS ONCE
Status: COMPLETED | OUTPATIENT
Start: 2018-01-01 | End: 2018-01-01

## 2018-01-01 RX ORDER — PROCHLORPERAZINE MALEATE 10 MG
10 TABLET ORAL EVERY 6 HOURS PRN
Status: CANCELLED | OUTPATIENT
Start: 2018-01-01

## 2018-01-01 RX ORDER — SODIUM CHLORIDE 9 MG/ML
INJECTION, SOLUTION INTRAVENOUS CONTINUOUS
Status: DISCONTINUED | OUTPATIENT
Start: 2018-01-01 | End: 2018-01-01

## 2018-01-01 RX ORDER — HYDROMORPHONE HYDROCHLORIDE 1 MG/ML
0.4 INJECTION, SOLUTION INTRAMUSCULAR; INTRAVENOUS; SUBCUTANEOUS EVERY 2 HOUR PRN
Status: DISCONTINUED | OUTPATIENT
Start: 2018-01-01 | End: 2018-01-01

## 2018-01-01 RX ORDER — DIPHENOXYLATE HYDROCHLORIDE AND ATROPINE SULFATE 2.5; .025 MG/1; MG/1
1 TABLET ORAL 4 TIMES DAILY
Status: DISCONTINUED | OUTPATIENT
Start: 2018-01-01 | End: 2018-01-01

## 2018-01-01 RX ORDER — MEPERIDINE HYDROCHLORIDE 50 MG/ML
12.5 INJECTION INTRAMUSCULAR; INTRAVENOUS; SUBCUTANEOUS ONCE
Status: COMPLETED | OUTPATIENT
Start: 2018-01-01 | End: 2018-01-01

## 2018-01-01 RX ORDER — LABETALOL 100 MG/1
100 TABLET, FILM COATED ORAL 2 TIMES DAILY
Status: DISCONTINUED | OUTPATIENT
Start: 2018-01-01 | End: 2018-01-01

## 2018-01-01 RX ORDER — SODIUM CHLORIDE 9 MG/ML
INJECTION, SOLUTION INTRAVENOUS
Status: COMPLETED
Start: 2018-01-01 | End: 2018-01-01

## 2018-01-01 RX ORDER — ONDANSETRON 2 MG/ML
4 INJECTION INTRAMUSCULAR; INTRAVENOUS EVERY 6 HOURS PRN
Status: DISCONTINUED | OUTPATIENT
Start: 2018-01-01 | End: 2018-01-01

## 2018-01-01 RX ORDER — LOPERAMIDE HYDROCHLORIDE 2 MG/1
2 CAPSULE ORAL 4 TIMES DAILY
Status: DISCONTINUED | OUTPATIENT
Start: 2018-01-01 | End: 2018-01-01

## 2018-01-01 RX ORDER — METOCLOPRAMIDE HYDROCHLORIDE 5 MG/ML
10 INJECTION INTRAMUSCULAR; INTRAVENOUS EVERY 6 HOURS PRN
Status: CANCELLED | OUTPATIENT
Start: 2018-01-01

## 2018-01-01 RX ORDER — MIDAZOLAM HYDROCHLORIDE 1 MG/ML
3.5 INJECTION INTRAMUSCULAR; INTRAVENOUS ONCE
Status: DISCONTINUED | OUTPATIENT
Start: 2018-01-01 | End: 2018-01-01

## 2018-01-01 RX ORDER — SODIUM CHLORIDE 9 MG/ML
INJECTION, SOLUTION INTRAVENOUS CONTINUOUS
Status: DISCONTINUED | OUTPATIENT
Start: 2018-01-01 | End: 2018-01-01 | Stop reason: DRUGHIGH

## 2018-01-01 RX ORDER — LIDOCAINE HYDROCHLORIDE 10 MG/ML
0.5 INJECTION, SOLUTION INFILTRATION; PERINEURAL ONCE AS NEEDED
Status: ACTIVE | OUTPATIENT
Start: 2018-01-01 | End: 2018-01-01

## 2018-01-01 RX ORDER — HYDROMORPHONE HYDROCHLORIDE 1 MG/ML
0.2 INJECTION, SOLUTION INTRAMUSCULAR; INTRAVENOUS; SUBCUTANEOUS EVERY 2 HOUR PRN
Status: DISCONTINUED | OUTPATIENT
Start: 2018-01-01 | End: 2018-01-01

## 2018-01-01 RX ORDER — SODIUM CHLORIDE 9 MG/ML
INJECTION, SOLUTION INTRAVENOUS
Status: DISPENSED
Start: 2018-01-01 | End: 2018-01-01

## 2018-01-01 RX ORDER — OXYCODONE AND ACETAMINOPHEN 7.5; 325 MG/1; MG/1
1 TABLET ORAL EVERY 4 HOURS PRN
Status: DISCONTINUED | OUTPATIENT
Start: 2018-01-01 | End: 2018-01-01

## 2018-01-01 RX ORDER — POTASSIUM CHLORIDE 20 MEQ/1
40 TABLET, EXTENDED RELEASE ORAL ONCE
Status: COMPLETED | OUTPATIENT
Start: 2018-01-01 | End: 2018-01-01

## 2018-01-01 RX ORDER — OXYCODONE AND ACETAMINOPHEN 10; 325 MG/1; MG/1
1 TABLET ORAL EVERY 6 HOURS PRN
Refills: 0 | Status: SHIPPED | COMMUNITY
Start: 2018-01-01 | End: 2018-01-01 | Stop reason: ALTCHOICE

## 2018-01-01 RX ORDER — MORPHINE SULFATE 10 MG/ML
5 INJECTION, SOLUTION INTRAMUSCULAR; INTRAVENOUS ONCE
Status: COMPLETED | OUTPATIENT
Start: 2018-01-01 | End: 2018-01-01

## 2018-01-01 RX ORDER — MORPHINE SULFATE 15 MG/1
30 TABLET ORAL EVERY 4 HOURS PRN
Status: DISCONTINUED | OUTPATIENT
Start: 2018-01-01 | End: 2018-01-01

## 2018-01-01 RX ORDER — ALBUMIN, HUMAN INJ 5% 5 %
250 SOLUTION INTRAVENOUS ONCE
Status: COMPLETED | OUTPATIENT
Start: 2018-01-01 | End: 2018-01-01

## 2018-01-01 RX ORDER — ALBUMIN, HUMAN INJ 5% 5 %
500 SOLUTION INTRAVENOUS ONCE
Status: COMPLETED | OUTPATIENT
Start: 2018-01-01 | End: 2018-01-01

## 2018-01-01 RX ORDER — MORPHINE SULFATE 2 MG/ML
1 INJECTION, SOLUTION INTRAMUSCULAR; INTRAVENOUS EVERY 2 HOUR PRN
Status: DISCONTINUED | OUTPATIENT
Start: 2018-01-01 | End: 2018-01-01

## 2018-01-01 RX ORDER — FENTANYL 50 UG/H
1 PATCH TRANSDERMAL
Status: DISCONTINUED | OUTPATIENT
Start: 2018-01-01 | End: 2018-01-01

## 2018-01-01 RX ORDER — FLUCONAZOLE 2 MG/ML
200 INJECTION, SOLUTION INTRAVENOUS EVERY 24 HOURS
Status: DISCONTINUED | OUTPATIENT
Start: 2018-01-01 | End: 2018-01-01

## 2018-01-01 RX ORDER — SODIUM CHLORIDE, SODIUM LACTATE, POTASSIUM CHLORIDE, CALCIUM CHLORIDE 600; 310; 30; 20 MG/100ML; MG/100ML; MG/100ML; MG/100ML
INJECTION, SOLUTION INTRAVENOUS
Status: COMPLETED
Start: 2018-01-01 | End: 2018-01-01

## 2018-01-01 RX ORDER — HYDROMORPHONE HYDROCHLORIDE 8 MG/1
8 TABLET ORAL EVERY 2 HOUR PRN
Qty: 30 TABLET | Refills: 0 | Status: SHIPPED | OUTPATIENT
Start: 2018-01-01 | End: 2018-01-01

## 2018-01-01 RX ORDER — HYDROMORPHONE HYDROCHLORIDE 1 MG/ML
1 INJECTION, SOLUTION INTRAMUSCULAR; INTRAVENOUS; SUBCUTANEOUS ONCE
Status: COMPLETED | OUTPATIENT
Start: 2018-01-01 | End: 2018-01-01

## 2018-01-01 RX ORDER — AMLODIPINE BESYLATE 10 MG/1
10 TABLET ORAL DAILY
Status: DISCONTINUED | OUTPATIENT
Start: 2018-01-01 | End: 2018-01-01

## 2018-01-01 RX ORDER — PANTOPRAZOLE SODIUM 40 MG/1
40 TABLET, DELAYED RELEASE ORAL
Qty: 30 TABLET | Refills: 0 | Status: ON HOLD | OUTPATIENT
Start: 2018-01-01 | End: 2019-01-01

## 2018-01-01 RX ORDER — LORAZEPAM 1 MG/1
TABLET ORAL EVERY 4 HOURS PRN
Status: CANCELLED | OUTPATIENT
Start: 2018-01-01

## 2018-01-01 RX ORDER — HYDROMORPHONE HYDROCHLORIDE 1 MG/ML
0.8 INJECTION, SOLUTION INTRAMUSCULAR; INTRAVENOUS; SUBCUTANEOUS EVERY 2 HOUR PRN
Status: DISCONTINUED | OUTPATIENT
Start: 2018-01-01 | End: 2018-01-01

## 2018-01-01 RX ORDER — POTASSIUM CHLORIDE 29.8 MG/ML
40 INJECTION INTRAVENOUS ONCE
Status: COMPLETED | OUTPATIENT
Start: 2018-01-01 | End: 2018-01-01

## 2018-01-01 RX ORDER — ALBUMIN, HUMAN INJ 5% 5 %
SOLUTION INTRAVENOUS
Status: COMPLETED
Start: 2018-01-01 | End: 2018-01-01

## 2018-01-01 RX ORDER — MORPHINE SULFATE 30 MG/1
30 TABLET, FILM COATED, EXTENDED RELEASE ORAL 2 TIMES DAILY
Status: DISCONTINUED | OUTPATIENT
Start: 2018-01-01 | End: 2018-01-01

## 2018-01-01 RX ORDER — MAGNESIUM SULFATE HEPTAHYDRATE 40 MG/ML
2 INJECTION, SOLUTION INTRAVENOUS ONCE
Status: COMPLETED | OUTPATIENT
Start: 2018-01-01 | End: 2018-01-01

## 2018-01-01 RX ORDER — DEXAMETHASONE SODIUM PHOSPHATE 4 MG/ML
4 VIAL (ML) INJECTION EVERY 6 HOURS
Status: DISCONTINUED | OUTPATIENT
Start: 2018-01-01 | End: 2018-01-01

## 2018-01-01 RX ORDER — ACETAMINOPHEN 325 MG/1
650 TABLET ORAL EVERY 6 HOURS PRN
Status: DISCONTINUED | OUTPATIENT
Start: 2018-01-01 | End: 2018-01-01

## 2018-01-01 RX ORDER — ONDANSETRON 2 MG/ML
8 INJECTION INTRAMUSCULAR; INTRAVENOUS EVERY 6 HOURS PRN
Status: CANCELLED | OUTPATIENT
Start: 2018-01-01

## 2018-01-01 RX ORDER — LIDOCAINE 50 MG/G
1 PATCH TOPICAL EVERY 24 HOURS
Status: DISCONTINUED | OUTPATIENT
Start: 2018-01-01 | End: 2018-01-01

## 2018-01-01 RX ORDER — LORAZEPAM 2 MG/ML
INJECTION INTRAMUSCULAR EVERY 4 HOURS PRN
Status: CANCELLED | OUTPATIENT
Start: 2018-01-01

## 2018-01-01 RX ORDER — HEPARIN SODIUM 5000 [USP'U]/ML
5000 INJECTION, SOLUTION INTRAVENOUS; SUBCUTANEOUS EVERY 8 HOURS SCHEDULED
Status: DISCONTINUED | OUTPATIENT
Start: 2018-01-01 | End: 2018-01-01

## 2018-01-01 RX ORDER — MAGNESIUM OXIDE 400 MG (241.3 MG MAGNESIUM) TABLET
400 TABLET ONCE
Status: DISCONTINUED | OUTPATIENT
Start: 2018-01-01 | End: 2018-01-01

## 2018-01-01 RX ORDER — LOPERAMIDE HYDROCHLORIDE 2 MG/1
2 CAPSULE ORAL 4 TIMES DAILY PRN
Status: DISCONTINUED | OUTPATIENT
Start: 2018-01-01 | End: 2018-01-01

## 2018-01-01 RX ORDER — HYDROMORPHONE HYDROCHLORIDE 8 MG/1
8 TABLET ORAL EVERY 2 HOUR PRN
Qty: 90 TABLET | Refills: 0 | Status: ON HOLD | OUTPATIENT
Start: 2018-01-01 | End: 2018-01-01

## 2018-01-01 RX ORDER — DIPHENOXYLATE HYDROCHLORIDE AND ATROPINE SULFATE 2.5; .025 MG/1; MG/1
1 TABLET ORAL 4 TIMES DAILY
Qty: 120 TABLET | Refills: 0 | Status: SHIPPED | OUTPATIENT
Start: 2018-01-01 | End: 2018-01-01

## 2018-01-01 RX ORDER — POTASSIUM CHLORIDE 14.9 MG/ML
20 INJECTION INTRAVENOUS ONCE
Status: COMPLETED | OUTPATIENT
Start: 2018-01-01 | End: 2018-01-01

## 2018-01-01 RX ORDER — ONDANSETRON HYDROCHLORIDE 8 MG/1
8 TABLET, FILM COATED ORAL EVERY 8 HOURS PRN
Status: DISCONTINUED | OUTPATIENT
Start: 2018-01-01 | End: 2018-01-01

## 2018-01-01 RX ORDER — FENTANYL 50 UG/H
1 PATCH TRANSDERMAL
Qty: 10 PATCH | Refills: 0 | Status: ON HOLD | OUTPATIENT
Start: 2018-01-01 | End: 2018-01-01

## 2018-01-01 RX ORDER — FLUCONAZOLE 2 MG/ML
200 INJECTION, SOLUTION INTRAVENOUS EVERY 24 HOURS
Refills: 0 | Status: SHIPPED | COMMUNITY
Start: 2018-01-01 | End: 2018-01-01 | Stop reason: ALTCHOICE

## 2018-01-01 RX ORDER — MORPHINE SULFATE 25 MG/ML
5 INJECTION, SOLUTION, CONCENTRATE INTRAVENOUS ONCE
Status: CANCELLED
Start: 2018-01-01 | End: 2018-01-01

## 2018-01-01 RX ORDER — FENTANYL 50 UG/H
1 PATCH TRANSDERMAL
Qty: 10 PATCH | Refills: 0 | Status: ON HOLD | OUTPATIENT
Start: 2018-01-01 | End: 2019-01-01

## 2018-01-01 RX ORDER — ONDANSETRON HYDROCHLORIDE 8 MG/1
8 TABLET, FILM COATED ORAL EVERY 8 HOURS PRN
Qty: 30 TABLET | Refills: 3 | Status: SHIPPED | OUTPATIENT
Start: 2018-01-01

## 2018-01-01 RX ORDER — PROCHLORPERAZINE MALEATE 10 MG
10 TABLET ORAL EVERY 6 HOURS PRN
Status: DISCONTINUED | OUTPATIENT
Start: 2018-01-01 | End: 2018-01-01

## 2018-01-01 RX ORDER — HYDROMORPHONE HYDROCHLORIDE 4 MG/1
8 TABLET ORAL EVERY 2 HOUR PRN
Status: DISCONTINUED | OUTPATIENT
Start: 2018-01-01 | End: 2018-01-01

## 2018-01-01 RX ORDER — POTASSIUM CHLORIDE 20 MEQ/1
20 TABLET, EXTENDED RELEASE ORAL ONCE
Status: COMPLETED | OUTPATIENT
Start: 2018-01-01 | End: 2018-01-01

## 2018-01-01 RX ORDER — HEPARIN SODIUM 5000 [USP'U]/ML
5000 INJECTION, SOLUTION INTRAVENOUS; SUBCUTANEOUS EVERY 12 HOURS SCHEDULED
Status: DISCONTINUED | OUTPATIENT
Start: 2018-01-01 | End: 2018-01-01

## 2018-01-01 RX ORDER — MORPHINE SULFATE 30 MG/1
30 TABLET, FILM COATED, EXTENDED RELEASE ORAL EVERY 12 HOURS SCHEDULED
Qty: 60 TABLET | Refills: 0 | Status: SHIPPED | OUTPATIENT
Start: 2018-01-01 | End: 2018-01-01

## 2018-01-01 RX ORDER — ACETAMINOPHEN 500 MG
1000 TABLET ORAL ONCE
Status: COMPLETED | OUTPATIENT
Start: 2018-01-01 | End: 2018-01-01

## 2018-01-01 RX ORDER — DIPHENOXYLATE HYDROCHLORIDE AND ATROPINE SULFATE 2.5; .025 MG/1; MG/1
TABLET ORAL
Qty: 120 TABLET | Refills: 1 | OUTPATIENT
Start: 2018-01-01

## 2018-01-01 RX ORDER — CIPROFLOXACIN 250 MG/1
250 TABLET, FILM COATED ORAL 2 TIMES DAILY
Qty: 14 TABLET | Refills: 0 | Status: SHIPPED | OUTPATIENT
Start: 2018-01-01 | End: 2018-01-01

## 2018-01-01 RX ORDER — SODIUM CHLORIDE 9 MG/ML
INJECTION, SOLUTION INTRAVENOUS CONTINUOUS
Status: ACTIVE | OUTPATIENT
Start: 2018-01-01 | End: 2018-01-01

## 2018-01-01 RX ORDER — SODIUM CHLORIDE 9 MG/ML
INJECTION, SOLUTION INTRAVENOUS ONCE
Status: COMPLETED | OUTPATIENT
Start: 2018-01-01 | End: 2018-01-01

## 2018-01-01 RX ORDER — MORPHINE SULFATE 4 MG/ML
4 INJECTION, SOLUTION INTRAMUSCULAR; INTRAVENOUS EVERY 2 HOUR PRN
Status: DISCONTINUED | OUTPATIENT
Start: 2018-01-01 | End: 2018-01-01

## 2018-01-01 RX ORDER — LOPERAMIDE HYDROCHLORIDE 2 MG/1
4 CAPSULE ORAL 4 TIMES DAILY
Status: DISCONTINUED | OUTPATIENT
Start: 2018-01-01 | End: 2018-01-01

## 2018-01-01 RX ORDER — SODIUM CHLORIDE 9 MG/ML
INJECTION, SOLUTION INTRAVENOUS
Status: DISCONTINUED
Start: 2018-01-01 | End: 2018-01-01

## 2018-01-01 RX ORDER — METHYLPREDNISOLONE SODIUM SUCCINATE 40 MG/ML
12.5 INJECTION, POWDER, LYOPHILIZED, FOR SOLUTION INTRAMUSCULAR; INTRAVENOUS EVERY 6 HOURS
Status: DISCONTINUED | OUTPATIENT
Start: 2018-01-01 | End: 2018-01-01

## 2018-01-01 RX ORDER — DEXAMETHASONE 4 MG/1
4 TABLET ORAL 2 TIMES DAILY WITH MEALS
Qty: 14 TABLET | Refills: 0 | Status: ON HOLD | OUTPATIENT
Start: 2018-01-01 | End: 2018-01-01

## 2018-01-01 RX ORDER — ONDANSETRON 2 MG/ML
4 INJECTION INTRAMUSCULAR; INTRAVENOUS ONCE
Status: COMPLETED | OUTPATIENT
Start: 2018-01-01 | End: 2018-01-01

## 2018-01-01 RX ORDER — HYDROMORPHONE HYDROCHLORIDE 8 MG/1
8 TABLET ORAL EVERY 2 HOUR PRN
Qty: 120 TABLET | Refills: 0 | Status: SHIPPED | OUTPATIENT
Start: 2018-01-01 | End: 2019-01-01

## 2018-01-01 RX ORDER — DIPHENOXYLATE HYDROCHLORIDE AND ATROPINE SULFATE 2.5; .025 MG/1; MG/1
1 TABLET ORAL 4 TIMES DAILY PRN
Status: DISCONTINUED | OUTPATIENT
Start: 2018-01-01 | End: 2018-01-01

## 2018-01-01 RX ORDER — ONDANSETRON 2 MG/ML
4 INJECTION INTRAMUSCULAR; INTRAVENOUS EVERY 4 HOURS PRN
Status: DISCONTINUED | OUTPATIENT
Start: 2018-01-01 | End: 2018-01-01

## 2018-01-01 RX ORDER — FENTANYL 50 UG/H
1 PATCH TRANSDERMAL
Qty: 10 PATCH | Refills: 0 | Status: SHIPPED | OUTPATIENT
Start: 2018-01-01 | End: 2018-01-01

## 2018-01-01 RX ORDER — 0.9 % SODIUM CHLORIDE 0.9 %
VIAL (ML) INJECTION
Status: DISPENSED
Start: 2018-01-01 | End: 2018-01-01

## 2018-01-01 RX ORDER — PROCHLORPERAZINE MALEATE 10 MG
10 TABLET ORAL EVERY 6 HOURS PRN
Qty: 60 TABLET | Refills: 3 | Status: SHIPPED | OUTPATIENT
Start: 2018-01-01

## 2018-01-01 RX ORDER — MIDAZOLAM HYDROCHLORIDE 1 MG/ML
INJECTION INTRAMUSCULAR; INTRAVENOUS
Status: COMPLETED
Start: 2018-01-01 | End: 2018-01-01

## 2018-01-01 RX ORDER — LOPERAMIDE HYDROCHLORIDE 2 MG/1
4 CAPSULE ORAL 4 TIMES DAILY PRN
Status: DISCONTINUED | OUTPATIENT
Start: 2018-01-01 | End: 2018-01-01

## 2018-01-01 RX ORDER — OXYCODONE AND ACETAMINOPHEN 10; 325 MG/1; MG/1
1 TABLET ORAL EVERY 6 HOURS PRN
Status: DISCONTINUED | OUTPATIENT
Start: 2018-01-01 | End: 2018-01-01

## 2018-01-01 RX ORDER — SODIUM CHLORIDE AND POTASSIUM CHLORIDE .9; .15 G/100ML; G/100ML
SOLUTION INTRAVENOUS CONTINUOUS
Status: DISCONTINUED | OUTPATIENT
Start: 2018-01-01 | End: 2018-01-01

## 2018-01-01 RX ORDER — PANTOPRAZOLE SODIUM 40 MG/1
40 TABLET, DELAYED RELEASE ORAL
Status: DISCONTINUED | OUTPATIENT
Start: 2018-01-01 | End: 2018-01-01

## 2018-01-01 RX ORDER — HYDROMORPHONE HYDROCHLORIDE 8 MG/1
8 TABLET ORAL EVERY 2 HOUR PRN
Qty: 30 TABLET | Refills: 0 | Status: ON HOLD | OUTPATIENT
Start: 2018-01-01 | End: 2018-01-01

## 2018-01-01 RX ORDER — SODIUM CHLORIDE 9 MG/ML
125 INJECTION, SOLUTION INTRAVENOUS CONTINUOUS
Status: DISCONTINUED | OUTPATIENT
Start: 2018-01-01 | End: 2018-01-01

## 2018-01-01 RX ORDER — MORPHINE SULFATE 4 MG/ML
2 INJECTION, SOLUTION INTRAMUSCULAR; INTRAVENOUS ONCE
Status: COMPLETED | OUTPATIENT
Start: 2018-01-01 | End: 2018-01-01

## 2018-01-01 RX ORDER — LOPERAMIDE HYDROCHLORIDE 2 MG/1
4 CAPSULE ORAL ONCE
Status: COMPLETED | OUTPATIENT
Start: 2018-01-01 | End: 2018-01-01

## 2018-01-01 RX ORDER — POTASSIUM CHLORIDE 20 MEQ/1
40 TABLET, EXTENDED RELEASE ORAL ONCE
Status: DISCONTINUED | OUTPATIENT
Start: 2018-01-01 | End: 2018-01-01

## 2018-01-01 RX ORDER — MORPHINE SULFATE 30 MG/1
30 TABLET ORAL EVERY 4 HOURS PRN
Qty: 180 TABLET | Refills: 0 | Status: ON HOLD | OUTPATIENT
Start: 2018-01-01 | End: 2018-01-01

## 2018-01-01 RX ORDER — SODIUM BICARBONATE 650 MG/1
1 TABLET ORAL AS NEEDED
Status: ON HOLD | COMMUNITY
Start: 2018-01-24 | End: 2018-01-01

## 2018-01-01 RX ORDER — ACETAMINOPHEN 10 MG/ML
INJECTION, SOLUTION INTRAVENOUS
Status: COMPLETED
Start: 2018-01-01 | End: 2018-01-01

## 2018-01-01 RX ADMIN — MORPHINE SULFATE 5 MG: 10 INJECTION, SOLUTION INTRAMUSCULAR; INTRAVENOUS at 10:52:00

## 2018-01-01 RX ADMIN — SODIUM CHLORIDE 1000 ML: 9 INJECTION, SOLUTION INTRAVENOUS at 10:36:00

## 2018-01-19 ENCOUNTER — HOSPITAL ENCOUNTER (OUTPATIENT)
Age: 68
Discharge: HOME | End: 2018-01-19
Attending: FAMILY MEDICINE
Payer: MEDICARE

## 2018-01-19 ENCOUNTER — APPOINTMENT (OUTPATIENT)
Dept: GENERAL RADIOLOGY | Age: 68
End: 2018-01-19
Attending: FAMILY MEDICINE
Payer: MEDICARE

## 2018-01-19 VITALS
SYSTOLIC BLOOD PRESSURE: 106 MMHG | RESPIRATION RATE: 18 BRPM | OXYGEN SATURATION: 96 % | DIASTOLIC BLOOD PRESSURE: 67 MMHG | HEART RATE: 76 BPM | TEMPERATURE: 98 F

## 2018-01-19 DIAGNOSIS — S42.402A CLOSED FRACTURE OF LEFT ELBOW, INITIAL ENCOUNTER: Primary | ICD-10-CM

## 2018-01-19 PROCEDURE — 99214 OFFICE O/P EST MOD 30 MIN: CPT

## 2018-01-19 PROCEDURE — 29105 APPLICATION LONG ARM SPLINT: CPT

## 2018-01-19 PROCEDURE — 99204 OFFICE O/P NEW MOD 45 MIN: CPT

## 2018-01-19 PROCEDURE — 73080 X-RAY EXAM OF ELBOW: CPT | Performed by: FAMILY MEDICINE

## 2018-01-19 RX ORDER — ACETAMINOPHEN AND CODEINE PHOSPHATE 300; 30 MG/1; MG/1
1-2 TABLET ORAL EVERY 4 HOURS PRN
Qty: 12 TABLET | Refills: 0 | Status: SHIPPED | OUTPATIENT
Start: 2018-01-19 | End: 2018-01-01

## 2018-01-19 NOTE — ED INITIAL ASSESSMENT (HPI)
Shoveling snow on Tuesday ad slipped fell onto outstretched left arm with pain in elbow regions. No gross deformity good radial pulse ? Muscle rupture with lump in upper forearm region. No elbow pain no wrist pain.

## 2018-01-19 NOTE — ED PROVIDER NOTES
Patient Seen in: [facility] [place]    History   CC:  Patient presents with:  Upper Extremity Injury (musculoskeletal)    Stated Complaint: lt arm injury    ------------------------------  Per Rn:   Shoveling snow on Tuesday ad air)    Current:/67   Pulse 76   Temp 98 °F (36.7 °C) (Oral)   Resp 18   SpO2 96%   We recommend that you schedule follow up care with a primary care provider within the next three months to obtain basic health screening including reassessment of you

## 2018-01-19 NOTE — ED NOTES
Long arm OCL mold applied CMS intact per and post application. Unable to scheldue appointment for today pt given [phone] for appointment and follow up inst/care. Ice elevate tylenol for pain. Call your pcp and let him know you have a fx elbow.  Copy of

## 2018-03-02 NOTE — TELEPHONE ENCOUNTER
Patient currently at the lab today asking what lab works Dr. Tong Monreal wants him to get for his 4 month visit this Monday, 3/5/18. Labs pended for review and dx. Routed to Dr. Tong Monreal to please advise.

## 2018-03-02 NOTE — TELEPHONE ENCOUNTER
Zahra Monge is calling to get a Rx for a night drainage bag  Ph. # [phone]    Routed to Rx  Pt.  Needs this today please advise

## 2018-03-05 PROBLEM — R25.1 TREMOR OF LEFT HAND: Status: ACTIVE | Noted: 2018-01-01

## 2018-03-05 NOTE — PROGRESS NOTES
Radha Gee is a 75year old male. HPI:   1. Chronic kidney disease, unspecified CKD stage  Creatinine up from 2.4 to 2.8    2. Hypertension, benign  He does not self test    3.  Malignant neoplasm of trigone of urinary bladder [facility])  He just saw Dr Conway [facility]) 2013   • Calculus of kidney    • Headache    • History of biopsy of bladder 2012   • Ileostomy in place [facility] 2013   • Osteoarthritis of both knees     severe    • Unspecified essential hypertension       Social History:  Smoking status: Never Smoker The patient indicates understanding of these issues and agrees to the plan. The patient is asked to return in 6 months, repeat labs in 4 months.

## 2018-04-24 PROBLEM — R53.81 MALAISE: Status: ACTIVE | Noted: 2018-01-01

## 2018-04-24 NOTE — PROGRESS NOTES
Nick Mendez is a 75year old male. HPI:   He has not felt well for the last week - malaise, anorexia (weight down 20 lbs in last 2 weeks), difficulty voiding, bone aches.  He sneezed 1 week ago and had immediate sharp stabbing pain in the left ant chest Anemia, iron deficiency 2008   • Sheppard's esophagus    • Bladder cancer ([facility] [address] 2013   • Calculus of kidney    • Headache    • History of biopsy of bladder 2012   • Ileostomy in place [facility]) 2013   • Osteoarthritis of both knees     severe    • Unspecified e

## 2018-04-25 NOTE — TELEPHONE ENCOUNTER
Per , the patient called requesting to  his lab results from the office later this morning. It does not appear that 4/24/18 lab results have been reviewed by Dr. Xander Comer yet.  This RN will review with Dr. Xander Comer and notify patient when the lab

## 2018-05-06 NOTE — ED NOTES
Pt states he has a Victor Manuel bladder x 5 years. Pt states he had cystoscopy last week.  Pt states last night he was supposed to take out his barton but missed the directions about deflating the balloon and caused pain and trauma--came to ER for removal. Per our ER

## 2018-05-06 NOTE — ED PROVIDER NOTES
Patient Seen in: [facility] AND [facility] Emergency Department    History   Patient presents with:  Urinary Symptoms (urologic)    Stated Complaint: needs cathater placed    HPI    74 yo presents for eval of urinary retention.  Pt had recent barton catheterizatio °F (36.4 °C) (Temporal)   Resp 18   Ht 175.3 cm (5' 9\")   Wt 97.1 kg   SpO2 99%   BMI 31.60 kg/m²         Physical Exam   Constitutional: He is oriented to person, place, and time. He appears well-developed and well-nourished. No distress.    HENT:   Head: order CBC WITH DIFFERENTIAL WITH PLATELET.   Procedure                               Abnormality         Status                     ---------                               -----------         ------                     CBC W/ DIFFERENTIAL[[id number]]

## 2018-05-06 NOTE — ED PROVIDER NOTES
Patient Seen in: [facility] AND [facility] Emergency Department    History   No chief complaint on file.     Stated Complaint: needs barton cath taken out    HPI    79-year-old male with history of bladder cancer status post recent cystoscopy be 3 days ago with Fo diarrhea, nausea and vomiting. Genitourinary: Negative for dysuria, flank pain and frequency. Musculoskeletal: Negative for back pain. Skin: Negative for rash. Neurological: Negative for weakness, light-headedness and headaches.    All other systems Skin: Skin is warm and dry. No rash noted. He is not diaphoretic. Psychiatric: He has a normal mood and affect. Nursing note and vitals reviewed. ED Course   Pulse Oximeter:  Pulse oximetry on room air is 99%, indicating adequate oxygenation. hematuria.       Disposition and Plan     Clinical Impression:  Encounter for Vicente catheter removal  (primary encounter diagnosis)    Disposition:  Discharge  5/5/2018 10:28 pm    Follow-up:  your urologist at [facility]    Schedule an appointment as soon as p

## 2018-05-06 NOTE — ED INITIAL ASSESSMENT (HPI)
Needs barton cath removed. Placed a few days ago and was told to take it out at home today. Pt unable to remove at home.

## 2018-05-06 NOTE — ED INITIAL ASSESSMENT (HPI)
Pt had cystoscopy last Wednesday, he had his barton removed last night and since then he has had difficulty urinating. Pt has also noticed blood in his urine, denies fever.

## 2018-05-06 NOTE — ED NOTES
Pt to er with barton cath in place and reports that he was instructed by his urologist to take the catheter out at home today. Pt unable to remove cath at home and would like us to take it out. Pt without other complaint.  Dr. Amaya Velez in triage to see patient a

## 2018-05-21 PROBLEM — C44.519 BASAL CELL CARCINOMA (BCC) OF BACK: Status: ACTIVE | Noted: 2018-01-01

## 2018-05-21 NOTE — PROGRESS NOTES
Ida Ramírez is a 75year old male. HPI:   1. Basal cell carcinoma (BCC) of back  He had recent wide excision and [facility] - doing well    2. Hypertension, benign  Running on the low side    3.  Chronic kidney • Osteoarthritis of both knees     severe    • Unspecified essential hypertension       Social History:  Smoking status: Never Smoker                                                              Smokeless tobacco: Never Used                      Alcohol

## 2018-06-23 NOTE — ED NOTES
PT safe to DC home per MD. Davian Jay to dress self. DC teaching done, pt verbalizes understanding. Ambulatory with steady gait to exit with family.

## 2018-06-23 NOTE — ED PROVIDER NOTES
Patient Seen in: [facility] AND [facility] Emergency Department    History   Patient presents with:  Pain (neurologic)    Stated Complaint: knee pain, right flank pain    HPI    60-year-old male with history of prior knee replacements about years ago saw his ort above.    Physical Exam   ED Triage Vitals [06/23/18 1104]  BP: 154/80  Pulse: 83  Resp: 16  Temp: 97.8 °F (36.6 °C)  Temp src: Temporal  SpO2: 98 %  O2 Device: None (Room air)    Current:/72   Pulse 84   Temp 97.8 °F (36.6 °C) (Temporal)   Resp 16 (*)     Bacteria Urine Many (*)     All other components within normal limits   BASIC METABOLIC PANEL (8) - Abnormal; Notable for the following:     Glucose 117 (*)     Chloride 115 (*)     CO2 16 (*)     BUN 36 (*)     Creatinine 2.70 (*)     GFR, Non-Afr mild degenerative endplate changes in the visualized thoracolumbar spine. OTHER: Negative. CONCLUSION: No acute cardiopulmonary abnormality. There are morphologic changes of COPD/emphysema.    Dictated by (CST): Christal Blair MD on 6/23/2018 at 12:23 cecum. ABDOMINAL WALL: There is diastasis recti with a small suprapubic ventral hernia as result of the diastases.   The defect in the abdominal wall is approximately 45 mm in diameter, with herniation of small bowel and 8 component of a neobladder into the changing in size could be due to evolving scar however this change in appearance is suspicious for tumor growth. Patient has history of high-grade papillary urothelial cancer of the bladder.   There is also a new sclerotic focus in the L4 vertebral body leung 125[address]  [phone]    Call  As needed    DR. Montano 12    Call in 2 days      We recommend that you schedule follow up care with a primary care provider within the next three months to obtain basic health screening including reassessment of your blood pre

## 2018-07-01 PROBLEM — Z85.51 HISTORY OF BLADDER CANCER: Status: ACTIVE | Noted: 2018-01-01

## 2018-07-01 PROBLEM — M84.651A: Status: ACTIVE | Noted: 2018-01-01

## 2018-07-01 PROBLEM — G89.3 PAIN FROM BONE METASTASES (HCC): Status: ACTIVE | Noted: 2018-01-01

## 2018-07-01 PROBLEM — C79.51 PAIN FROM BONE METASTASES (HCC): Status: ACTIVE | Noted: 2018-01-01

## 2018-07-01 NOTE — H&P
Janeva 62 Patient Status:  Emergency    1950 MRN [id number]   Location [address] Attending Consuelo Mar MD   Hosp Day # 0 PCP Teresita Fernandez.  MD Vernell     Date:   use: No              Allergies/Medications:    Allergies:   Radiology Contrast *    OTHER (SEE COMMENTS)    Comment:Pt has neobladder    (Not in a hospital admission)    Review of Systems:   Constitutional: negative  Eyes: negative  Ears, nose, mouth, throa ALB 3.1 (L) 04/24/2018   ALKPHO 258 (H) 04/24/2018   BILT 1.1 04/24/2018   TP 7.6 04/24/2018   AST 35 04/24/2018   ALT 45 04/24/2018   PTT 28.1 03/27/2012   INR 1.0 03/27/2012   PT 12.7 03/27/2012   TSH 1.80 03/02/2018   MG 2.0 04/04/2013       Recent La

## 2018-07-01 NOTE — ED INITIAL ASSESSMENT (HPI)
Via medics from home--c/o right knee pain , worsening over the last 3 weeks. Hx of right knee replacement 8 years ago.  Denies any new injury/trauma    Saw Dr. Nesha Ibarra, prescribed higher dose Percocet on Thursday without relief    Difficulty ambulating

## 2018-07-01 NOTE — ED PROVIDER NOTES
Patient Seen in: [facility] AND [facility] Emergency Department    History   Patient presents with:  Knee Pain    Stated Complaint: right knee pain    HPI    The patient is a 79-year-old male who presents with 3 weeks of gradually increasing right knee pain that Oral  SpO2: 99 %  O2 Device: None (Room air)    Current:/72   Pulse 88   Temp 98.1 °F (36.7 °C) (Oral)   Resp 15   Ht 175.3 cm (5' 9\")   Wt 96.2 kg   SpO2 98%   BMI 31.31 kg/m²         Physical Exam   Constitutional: He is oriented to person, place, WITH PLATELET.   Procedure                               Abnormality         Status                     ---------                               -----------         ------                     CBC W/ DIFFERENTIAL[[id number]]          Abnormal            Final cancer    Disposition:  Admit  7/1/2018 11:37 am    Follow-up:  No follow-up provider specified.   We recommend that you schedule follow up care with a primary care provider within the next three months to obtain basic health screening including reassessmen

## 2018-07-02 NOTE — PROGRESS NOTES
[facility]    Progress Note    Lindsay Ramirez Patient Status:  Inpatient    1950 MRN [id number]   Location [facility] 4W/SW/SE Attending Silverio Marques MD   Hosp Day # 0 PCP Kishan Fisher.  MD Vernell       Subjective:   Adeel Hannon

## 2018-07-02 NOTE — PROGRESS NOTES
[facility]OSP - [facility]    Progress Note    Susie Burdick Patient Status:  Inpatient    1950 MRN [id number]   Location [facility] 4W/SW/SE Attending Alvino Whitaker MD   Hosp Day # 1 PCP Toma Matt MD       Subjective:   Ramiro Lozano (CST): Humberto Bates MD on 7/01/2018 at 9:15               • Heparin Sodium (Porcine)  5,000 Units Subcutaneous [facility]   • lidocaine  1 patch Transdermal Q24H   • AmLODIPine Besylate  10 mg Oral Daily   • Labetalol HCl  100 mg Oral BID     oxycodone-acet

## 2018-07-02 NOTE — CONSULTS
[facility]    PATIENT'S NAME: Brunilda Doradoy   ATTENDING PHYSICIAN: Zaria Jacobo MD   CONSULTING PHYSICIAN: Trisha Stiles.  Andrea Mcknight MD   PATIENT ACCOUNT#:   [id number]    LOCATION:  [address] [place] [address] RECORD #:   [id number]       DATE OF BIRTH:  [birth year] HPI; hydronephrosis, chronic kidney disease as noted above; osteoarthritis status post bilateral knee replacements; hypertension. MEDICATIONS:  Currently, hydromorphone, prophylactic heparin.     ALLERGIES:  Iodine contrast.    SOCIAL HISTORY:  Patient i He has a subacute nondisplaced right distal femur fracture that is pathologic. The patient has radiographic and clinical evidence of advanced stage malignancy at this time, and we will need tissue confirmation.   We will defer to Orthopedic Surgery on

## 2018-07-02 NOTE — PROGRESS NOTES
[facility] HOSP - [place] [facility]    Hematology/Oncology   Progress Note    Myrna Philip Patient Status:  Inpatient    1950 MRN [id number]   Location [facility] 4W/SW/SE Attending Consuelo Mar MD   Hosp Day # 1 PCP Teresita Fernandez.  MD Sebastian Matt recurrent urinary tract infections, chronic kidney disease, and was set up to undergo conversion to ileal conduit in the next several weeks. He is now admitted to the hospital with radiographic evidence of widespread osseous metastatic disease.   He has a

## 2018-07-02 NOTE — CONSULTS
[facility]    PATIENT'S NAME: Del Elo PHYSICIAN: Zaria Jacobo MD   CONSULTING PHYSICIAN: Charlene Mata MD   PATIENT ACCOUNT#:   [id number]    LOCATION:  [address] [place] [address] RECORD #:   [id number]       DATE OF BIRTH:  [birth year] Right lower extremity:  The patient is tender to palpation at the proximal femur. There is no gross deformity. There is no erythema. Overall alignment is acceptable. The patient is a 5/5 strength on dorsiflexion and plantar flexion of the foot.   Sensat Dr. Kwan Comer.     Dictated By Ji Carranza MD  d: 07/01/2018 19:20:15  t: 07/01/2018 84:00:70  [facility] [id number]  /

## 2018-07-02 NOTE — CM/SW NOTE
CTL progression of care note: per report pt admitted with pathological fx right femur. Pt has a history of bladder CA with creation of neobladder at [facility] in 2013.     Recommendation is for patient to transfer to tertiary center that has an Caitlin Maldonado in Medical Records . I will  copy of chart and CD at 3 pm.    Pt's face sheet with Dr. Chandrika Nix contact number re-faxed. First fax did not go through. Pt signed release of medical records form. Form taken to Medical Records.  Received je

## 2018-07-02 NOTE — PROGRESS NOTES
07/02/18 9667   Clinical Encounter Type   Visited With Patient   Routine Visit Introduction   Continue Visiting Yes   Referral From Nurse   Referral To    Patient Spiritual Encounters   Spiritual Assessment Completed Yes   Spiritual Needs Patien

## 2018-07-09 NOTE — TELEPHONE ENCOUNTER
I did speak to Josie and he was operated on at  or  and a \"plate\" placed about knee fracture. PSA 0.0. No diagnosis as to etiology of bony mets. He is seeing Onc at  or  tomorrow.  Today he developed diplopia which persists and I advised that he karen

## 2018-07-24 NOTE — TELEPHONE ENCOUNTER
Pt would like to have Dr BARONE call him today to discuss some things going on with the patient, [phone]    Tasked to nursing

## 2018-07-26 NOTE — TELEPHONE ENCOUNTER
Pt would like to speak with you before his appt with specialist tomorrow    -pt advised Dr BARONE is not in the office today

## 2018-07-30 NOTE — TELEPHONE ENCOUNTER
Pt. Is calling back to speak with Dr. Zain Dewey he is seeing a specialist at [facility]. and would like to discus with Dr. Zain Dewey ph. # [phone]   Routed to clinical

## 2018-07-30 NOTE — TELEPHONE ENCOUNTER
I did discuss in detail his course at [facility]. He had neobladder removed and conduit placed and he feels a little better. He had ;femoral bone bx and is awaiting results. He did relate diplopia to team at [facility] and he has lesion In the clivus and is getting single dose of RT. He is eating reasonably well. No pain. He will call me if he needs something.

## 2018-08-15 PROBLEM — T81.30XA ABDOMINAL WOUND DEHISCENCE, INITIAL ENCOUNTER: Status: ACTIVE | Noted: 2018-01-01

## 2018-08-15 PROBLEM — T81.30XA ABDOMINAL WOUND DEHISCENCE: Status: ACTIVE | Noted: 2018-01-01

## 2018-08-15 NOTE — ED NOTES
Pt resting comfortably, continues to deny pain. Pt denies additional eneds. Will continue to monitor.

## 2018-08-15 NOTE — WOUND PROGRESS NOTE
Wound Care Services  Follow up on the pt's lower abdominal wound pouch, it is on and intact, no leaking, emptied 200 cc of liquid tan yellow exudate, also emptied the ileal conduit. Spoke with the pt's nurse, will leave extra supplies at the bedside.  Per t

## 2018-08-15 NOTE — CONSULTS
[facility]    Report of Consultation    Cheko Oliver Patient Status:  Inpatient    1950 MRN [id number]   Location [facility] 4W/SW/SE Attending Sameera Lemons, [address] [facility] Day # 0 PCP Marcus Becerra.  MD Vernell     Date of Admissi Smoking status: Never Smoker                                                              Smokeless tobacco: Never Used                      Alcohol use:  No                    Current Medications:    Current Facility-Administered Medications:  Fluconazole Blood pressure 100/55, pulse 85, temperature 97.6 °F (36.4 °C), temperature source Oral, resp. rate 18, height 5' 9\" (1.753 m), weight 200 lb (90.7 kg), SpO2 100 %. Physical Exam    Constitutional: He is oriented to person, place, and time.  He appears -resolved wound/gi bleeding. Pt is clinically stable without evidence of sepsis or hemorrhagic shock. Would treat as a spontaneous ileostomy with local wound care and IV hydration to replace GI fluid loss. Transfer to [facility] when bed is available.   Nya Forbes

## 2018-08-15 NOTE — PROGRESS NOTES
[facility]    Report of Consultation    Fiordaliza Oconnell Patient Status:  Inpatient    1950 MRN [id number]   Location [facility] 4W/SW/SE Attending Avinash Taylor, [address] [facility] Day # 0 PCP Dontrell Rodriguez.  MD Vernell     Date of Admissi distended neobladder, and UTI. He was treated with antibiotics and CT continued to show a distended neobladder and bilateral hydronephrosis. Ultimately, he underwent conversion of Neobladder to Ileal Conduit 7/16/2018.  Pathology report from neobladder with Sruthi.            Past Medical History  Past Medical History:   Diagnosis Date   • Abscess 2013    s/p neobladder   • Anemia, iron deficiency 2008   • Sheppard's esophagus    • Basal cell carcinoma    • Bladder cancer [facility]) 2013    Bladder Surgery- Neobl Intravenous Once   sodium chloride 0.9% IV bolus 500 mL 500 mL Intravenous Once       Prescriptions Prior to Admission:  oxyCODONE-acetaminophen  MG Oral Tab Take 1 tablet by mouth every 6 (six) hours as needed for Pain.    Labetalol HCl 100 MG Oral T transferred to the [facility] where his care has been  –Undergoing for the last few years. I spoke with Dr. Bertrand holman and she will try to work on this later today as the patient appears to be clinically stable for transfer.     We will contin

## 2018-08-15 NOTE — ED PROVIDER NOTES
Patient Seen in: [facility] AND [facility] Emergency Department    History   Patient presents with:  Wound    Stated Complaint: wound    HPI    29-year-old male with history of bladder cancer status post bladder resection and creation of neobladder 5 years ago w for stated complaint: wound  Other systems are as noted in HPI. Constitutional and vital signs reviewed. All other systems reviewed and negative except as noted above.     Physical Exam   ED Triage Vitals [08/14/18 2357]  BP: 116/66  Pulse: 98  Resp: DIFFERENTIAL WITH PLATELET.   Procedure                               Abnormality         Status                     ---------                               -----------         ------                     CBC W/ DIFFERENTIAL[[id number]]          Abnormal

## 2018-08-15 NOTE — TELEPHONE ENCOUNTER
Jaime Ochoa is calling to let Dr. Luis Greene know he is currently admitted in the hospital here, Room 452. They will be transferring him to [facility] when a bed becomes available. He canceled his appointment for Friday.

## 2018-08-15 NOTE — H&P
Janeva 62 Patient Status:  Emergency    1950 MRN [id number]   Location [address] Attending Estefani Canas MD   Hosp Day # 0 PCP Laurita Malone.  MD Vernell     Date:  8 drink alcohol or use drugs. Allergies:    Radiology Contrast *    OTHER (SEE COMMENTS)    Comment:Pt has neobladder    Home Medications:  Prior to Admission Medications   Prescriptions Last Dose Informant Patient Reported? Taking?    AmLODIPine Besylate normal bowel sounds, no organomegaly. Lymphatics:  No lymphadenopathy neck, axilla, groin. Musculoskeletal: Normal range of motion. normal strength. Feet:  Normal pulses.   Neurologic:  Alert, oriented, no focal deficits, cranial nerves II-XII are gross

## 2018-08-15 NOTE — ED NOTES
Care assumed from ems. Pt from home, c/o enlarged wound to the lower-mid abd. Pt states that he previously had a neobladder d/t bladder cancer, and it was removed at [facility] on 7/16/18, and he now has a urostomy.  Pt states that the wound from the previous n

## 2018-08-15 NOTE — ED INITIAL ASSESSMENT (HPI)
Recent colostomy (7/16/18) states that he started having bleeding and oozing from wound on lower abdomen since 1100am. Denies fevers, pain.

## 2018-08-15 NOTE — PROGRESS NOTES
Pt seen and examined. Called [facility] and started transfer process. Discussed with Dr Chinedu Riojas and Dr Cristian Kim. Hold on CT for now. Cont iv abx. Cont ivf's. Bolus again 500 cc now. Dr Vaishali Dickerson to see.      Tobias Recio  8/15/2018

## 2018-08-15 NOTE — WOUND PROGRESS NOTE
WOUND CARE NOTE      PLAN   Recommendations:  Dr. Violeta Rangel and Dr Shoaib Ivy are on consult to see the pt.    Dietary consult for recommendations for nutrition to optimize wound healing  Turn schedules  Heels elevated using pillows, heel wedge or heel boots to not have a bed at this time. Spoke with the pt's nurse, will call Dr Michelle Dueñas for orders.      Ostomy:  Type: Ileal Conduit  Location of Stoma: RLQ  Stoma Appearance: pink  Peristomal skin: DEREK - appliance is on and intact  Appliance: One Piece Urostomy appli

## 2018-08-16 NOTE — PLAN OF CARE
DISCHARGE PLANNING    • Discharge to home or other facility with appropriate resources Not Progressing        Patient/Family Goals    • Patient/Family Long Term Goal Not Progressing    • Patient/Family Short Term Goal Not Progressing          SAFETY ADULT

## 2018-08-16 NOTE — WOUND PROGRESS NOTE
Wound Care Services  Went back to the pt's room to bring additional supplies to the bedside, the pt's nurse is at the bedside, the pouch is full again, noted some leaking at 6 o'clock- the pouch was changed again, used a 4 inch one piece appliance with eak

## 2018-08-16 NOTE — WOUND PROGRESS NOTE
Wound Care Services  Follow up on the pt's lower mid abdominal dehisced wound, noted a small amt of leaking at 6 o'clock on the one piece appliance, the appliance was removed and the skin was cleansed, the pt. is still putting out copious amounts of yellow

## 2018-08-16 NOTE — WOUND PROGRESS NOTE
Wound Care Services  Follow up on the pt's lower abdominal wounds, the Ileal conduit appliance is intact with clear yellow urine, Lower Abdominal wound one piece pouch is intact with a seal, this am the exudate from the wounds is bloodier , yesterday it wa

## 2018-08-16 NOTE — PROGRESS NOTES
[place] [facility] - [facility]    Progress Note    Harshil Headley Patient Status:  Inpatient    1950 MRN [id number]   Location [facility] 4W/SW/SE Attending Maryln Denver, [address] [facility] Day # 1 PCP Raghav Matt MD     Assessment and Plan: 08/16/2018   BUN 25 (H) 08/16/2018    08/16/2018   K 3.8 08/16/2018   K 3.8 08/16/2018    (H) 08/16/2018   CO2 18 (L) 08/16/2018    (H) 08/16/2018   CA 7.7 (L) 08/16/2018   ALB 3.1 (L) 04/24/2018   ALKPHO 258 (H) 04/24/2018   BILT 1.1 04

## 2018-08-16 NOTE — PROGRESS NOTES
[place] [facility] - [facility]    Progress Note    Fiordaliza Oconnell Patient Status:  Inpatient    1950 MRN [id number]   Location [facility] 4W/SW/SE Attending Avinash Taylor, [address] [facility] Day # 1 PCP Dontrell Rodriguez.  MD Vernell       Subjective:   Galilea Santo 12.7 03/27/2012   TSH 1.80 03/02/2018   PSA 0.0 07/01/2018   MG 2.0 04/04/2013                 Results:     CBC:    Lab Results  Component Value Date   WBC 2.9 (L) 08/16/2018   WBC 3.8 (L) 08/15/2018   WBC 4.7 08/15/2018     Lab Results  Component Value Da > 50% time was spent counseling patient, discussing plan of care, discussing labs and imaging findings. Spoke with consultant. All questions answered.          1/14/3987     Certification      PHYSICIAN Certification of Need for Inpatient Hospitalization -

## 2018-08-16 NOTE — PROGRESS NOTES
[facility]    Hematology/Oncology   Progress Note    Nick Mendez Patient Status:  Inpatient    1950 MRN [id number]   Location [facility] 4W/SW/SE Attending Beba Valenzuela, [address] [facility] Day # 1 PCP Taran Arreguin.  MD Vernell at [address] [place] [address].   We discussed our general treatment plan and we have conferred with Oncology at [address] [place] [address], with Dr. Cornelio Cruz, who is in agreement.     We will arrange for the patient to be set up to be seen here at the [facility]

## 2018-08-16 NOTE — PLAN OF CARE
Integumentary status not within defined limits    • Pt's integumentary status will be adequate for discharge Not Progressing          DISCHARGE PLANNING    • Discharge to home or other facility with appropriate resources Progressing        PAIN - ADULT

## 2018-08-16 NOTE — CONSULTS
[facility]    PATIENT'S NAME: Ashley Stanislaw   ATTENDING PHYSICIAN: Linda Maciel DO   CONSULTING PHYSICIAN: Dontrell Mosquera.  MD Nadine   PATIENT ACCOUNT#:   [id number]    LOCATION:  [address] [place] [address] RECORD #:   [id number]       DATE OF BIRTH: post bilateral knee replacements; hypertension. MEDICATIONS:  Current medications:  Piperacillin, tazobactam, labetalol, heparin, fluconazole. ALLERGIES:  Iodine contrast.      FAMILY HISTORY:  No history of malignancy.     SOCIAL HISTORY:  The le continue to recover before we can start treatment. He is currently dealing with wound infection/dehiscence and will be seen by his surgeon at [facility].   We discussed our general treatment plan and we have conferred with Oncology at [facility]

## 2018-08-16 NOTE — CM/SW NOTE
Progression of care note: text pg to Dr. Modi Primes r/t transfer to [facility]. Informed pt's RN Charley Toribio.

## 2018-08-17 NOTE — PLAN OF CARE
RN received call from Sadiq Martinez from [facility] stating a bed was available for patient to transfer [facility]. Per Sadiq Martinez, patient transferring to [facility], room 8024. Address provided was [address] [place] [address].     Dr. Jamaal Valencia

## 2018-09-17 NOTE — PROGRESS NOTES
Cancer Center Progress Note    Patient Name: Claire Elias   YOB: 1950   Medical Record Number: [id number]   Attending Physician: Neymar Hurt M.D.      Chief Complaint:  Metastatic bladder cancer with bone metastasis    History of Present [place] biopsy of bladder 2012   • Ileostomy in place [facility]) 2013   • Osteoarthritis of both knees     severe    • Unspecified essential hypertension        Past Surgical History:  Past Surgical History:  2013: CONTINENT URINARY DIVERSION      Comment:  NEOBLADDER Medications:  No current outpatient medications on file.     Allergies:    Radiology Contrast *    OTHER (SEE COMMENTS)    Comment:Pt has neobladder     Review of Systems:  All other systems reviewed and negative x12    Vital Signs:  /70 (BP Location: had radical cystoprostatectomy and orthotopic neobladder in January 2013 at [address] [place] [address] with Dr. Loida Nelson for T1 BCG refractory disease. He received palliative radiotherapy to symptomatic clival region at the [address] [place] [address].   He has

## 2018-10-02 NOTE — PROGRESS NOTES
S:  70year old male presents today with increasing cancer related pain involving his low back and superior sternal region. He states the pain increased over the weekend and is not relieved with Naprosyn.   He received oxycodone during his August 2018 [address] [place] [address] Reviewed SEs.   Stool softener prn  (4) Rad Onc consult with Dr. April Figueroa on 10/4/18  (5) Call prn  (6) Follow-up with Dr. Vish Ceron is on 10/29/18    Discussed with Dr. Vish Creon

## 2018-10-02 NOTE — TELEPHONE ENCOUNTER
Dr. Luis Greene spoke with patient. Acute care appointment with Jorge 182Per booked for 130pm today. Patient verbalized understanding.

## 2018-10-02 NOTE — TELEPHONE ENCOUNTER
Toxicities:  Pt of Dr Ramakrishna Yoder with Metastatic Bladder Caner with brain & bone mets. Pt not receiving active treatment at this time. Post hospital f/u appt with Dr Ramakrishna Yoder 9/17/2018. Pt hospitalized 8/14/2018 - 8/16/2018 at [address] [place] [address] for abdominal wound dehiscence.  Pt

## 2018-10-02 NOTE — TELEPHONE ENCOUNTER
Randall Lean calling having all over body pain, back, chest no heart attack status.  He is requesting a CT scan. selma

## 2018-10-02 NOTE — PROGRESS NOTES
Patient seen and examined with KELLI Charles. Please see previous documentation for full cancer history. He has metastatic bladder cancer. He has bone metastasis. He is here with increased bone pain. Examination is comfortable.   He has chronic woun

## 2018-10-04 NOTE — CONSULTS
RADIATION ONCOLOGY NOTE    DATE OF VISIT: 10/4/2018    DIAGNOSIS : Metastatic bladder cancer, with symptomatic bone metastases and thoracic spine involvement for palliative radiotherapy shortly.     Dear Gabo Bryan and colleagues,    Thank you for asking us t Corwin Mcneil MD on 10/03/201    ROS    A 12 Point review of system was obtained and is as above and per HPI and nursing note. Review of Systems   Constitutional:        TPN daily from 330pm-530am   HENT: Negative.     Eyes: Positive for visual disturbanc alcohol or use drugs. PAST FAMILY HISTORY   family history includes Cancer in his brother; Heart Disease in his father and mother. PHYSICAL EXAM  Blood pressure 121/74, pulse 89, temperature 98.4 °F (36.9 °C), temperature source Oral, resp.  rate 18, directly. Juan Carlos Dunham MD, [address] [place] [address] [email or web address] Kenrick Villa  [phone]    10/4/2018    ==    PROCEDURE:  NM BONE SCAN THREE PHASE ([id number]=[id number])     COMPARISON: [facility], X KNEES SHAKEEL, 9/16/2010, 13:52.   Massive Damage and left mid femur are   all highly suspicious for osseous metastases. Increased uptake in the right 3rd rib laterally is also suspicious for metastasis. There are bilateral knee replacements present. There is neobladder present.   Comparison is made wit Adjustment of the mA and/or kV was done based on the patient's size. Use of iterative   reconstruction technique for dose reduction was used. Lack of IV contrast limits the evaluation of the vasculature and solid organs for subtle pathology.   Account 6/2018, and is new since 2014. There is a low-density angioma in the left adrenal gland. KIDNEYS:          There is no acute hydronephrosis. There is chronic right hydroureteronephrosis and cortical thinning. No left hydronephrosis.  Nonobstructing 2 mm ca progression of metastatic disease. 2. Progression of osseous metastatic disease. Large mixed sclerotic and lytic lesions present within the manubrium, multiple vertebral bodies, right 10th rib, and throughout the pelvis.  Soft tissue components are most

## 2018-10-04 NOTE — PROGRESS NOTES
Nursing Consultation Note  Patient: Oleg Dee  YOB: 1950  Age: 70year old  Radiation Oncologist: Dr. Rangel Sick  Referring Physician: Annabelle Love  Diagnosis:No diagnosis found.   Consult Date: 10/4/2018      Chemotherapy: yes  Labs: [facility] [facility], [address] [facility] [phone], [facility]  Phone: [phone] Fax: [address]  Severa Rings. [phone], 630 daily; Iced tea 8 oz daily        Occupational Exposure: Not Asked        Hobby Hazards: Not Asked        Sleep Concern: Not Asked        Stress Concern: Not Asked        Weight Concern: Not Asked        Special Diet: Not Asked        Back Care: Not Asked

## 2018-10-04 NOTE — PROGRESS NOTES
Primary language:  English  Language line required?  no  Comprehension Ability:  good  Able to read?  yes  Able to write? yes  Communication tools:  na  Patient's ability to learn:  good  Readiness to learn:   Motivated  Learning preferences:  Discussion, Interventions:  Provide safe environment while in department  Provide escort while in department  Discuss safety measures at home with patient family members    Expected Outcomes:  No injury, trauma or fall  Knowledge of care plan    Progress Toward Outcom

## 2018-10-12 NOTE — TELEPHONE ENCOUNTER
Toxicities:  Pt of Dr Jim Arnold with metastatic bladder cancer with brain & bone mets. Palliative systemic treatment has been delayed to to wound dehiscence s/p conversion of neobladder to ileal conduit (completed 7/16/2018 at [facility]).  Follow up appt with Dr Cifuentes

## 2018-10-12 NOTE — TELEPHONE ENCOUNTER
Karen Sharma has been nauseated this morning . No fever or diarrhea. He does have a nurse that comes to the home twice a week.  selma

## 2018-10-15 NOTE — PROGRESS NOTES
[facility] Radiation Treatment Management Note 1-5    Patient:  Ar Suarez  Age:  70year old  Visit Diagnosis:    1.  Bone metastases ([address]      Primary Rad/Onc:  Dr. Jacki Gardner    Site Delivered Dose (Gy) Prescribed Dose (Gy) Heike Lazar

## 2018-10-22 NOTE — ED PROVIDER NOTES
Patient Seen in: [facility] AND [facility] Emergency Department    History   Patient presents with:  Abnormal Result (metabolic, cardiac)    Stated Complaint: K 7.9    HPI    76-year-old male patient sent to the emergency department for abnormal lab results from Physical Exam    Gen: Awake alert in no apparent distress  HEENT: Anicteric, EOMI, PERRL, clear oropharynx  Heart: Regular rate and rhythm, no murmur  Lungs: Normal respiratory effort, clear lungs  Abdomen: Soft,  nondistended, non tender  : No C underlying medical condition.             Disposition and Plan     Clinical Impression:  Abnormal laboratory test result  (primary encounter diagnosis)    Disposition:  Discharge  10/22/2018  4:43 pm    Follow-up:  Taisha Rae MD  [address]

## 2018-10-22 NOTE — ED INITIAL ASSESSMENT (HPI)
Pt sts that he had blood test this morning and received a call this afternoon advising him to come to ER d/t potassium of 7.3, denies any symptom, denies any complaints

## 2018-10-22 NOTE — PROGRESS NOTES
[facility] Radiation Treatment Management Note 1-5    Patient:  Marilee Cap  Age:  70year old  Visit Diagnosis:    1.  Bone metastases ([address]      Primary Rad/Onc:  Dr. Emily Gardner    Site Delivered Dose (Gy) Prescribed Dose (Gy) Brandy Suarez

## 2018-10-24 NOTE — TELEPHONE ENCOUNTER
I did d/w Lina Noe - main concern is whether or not to start chemotherapy. He will d/w Dr Betty Lantigua tomorrow. Advised to call me with anything I can help him with.

## 2018-10-24 NOTE — TELEPHONE ENCOUNTER
Pt asked that Dr Romel Nunez please call in regards to some issues pt has, needs advice  Pt hoping to hear back today if possible  Tasked to Dr Romel Nunez

## 2018-10-25 PROBLEM — C67.9 MALIGNANT NEOPLASM OF URINARY BLADDER (HCC): Status: ACTIVE | Noted: 2018-01-01

## 2018-10-25 NOTE — TELEPHONE ENCOUNTER
Gwendolyn levine wants to speak to Dr Jim Arnold. He will be in today at 10:30 for radiation. He received bad news from [facility] yesterday.  selma

## 2018-10-25 NOTE — TELEPHONE ENCOUNTER
Patient in waiting room awaiting RT tx. I spoke to him in person, he has many questions for Dr Gregorio Bryan- I rescheduled his 10/29 appt to today.

## 2018-10-25 NOTE — PROGRESS NOTES
Cancer Center Progress Note    Patient Name: Ida Ramírez   YOB: 1950   Medical Record Number: [id number]   Attending Physician: Vicky Ibarra M.D.      Chief Complaint:  Metastatic bladder cancer with bone metastasis    History of Present [place] History:   Diagnosis Date   • Abscess 2013    s/p neobladder   • Anemia, iron deficiency 2008   • Sheppard's esophagus    • Basal cell carcinoma    • Bladder cancer [facility] 2013    Bladder Surgery- Neobladder   • Calculus of kidney    • Headache    • History Asked        Special Diet: Not Asked        Back Care: Not Asked        Exercise: Not Asked        Bike Helmet: Not Asked        Seat Belt: Not Asked        Self-Exams: Not Asked    Social History Narrative      Not on file      Current Medications:    Cur 8.8 10/22/2018    OSMOCALC 305 (H) 10/22/2018    ALKPHO 258 (H) 04/24/2018    AST 35 04/24/2018    ALT 45 04/24/2018    ALKPHOS 105 (H) 03/16/2016    BILT 1.1 04/24/2018    TP 7.6 04/24/2018    ALB 3.1 (L) 04/24/2018    GLOBULIN 4.5 (H) 04/24/2018    AGRAT

## 2018-10-26 NOTE — TELEPHONE ENCOUNTER
Mr Izabella Pierre is calling on behalf of Williams Sheehan. . Mr Izabella Pierre stated, he is now on the patient's PHI as of today, and would like to speak with Dr Kristina Villatoro. He states, Adan Mullen has a complicated case and that is his reasoning for wanting to speak with the doctor.

## 2018-10-29 NOTE — PROGRESS NOTES
Medication Education Record: IV Therapy    Learner:  Patient    Barriers / Limitations:  None    Diagnosis:   Bladder cancer    IV Cancer Treatment Name(s): Gemcitabine and Carboplatin  IV Cancer Treatment Frequency Once weekly for 2 weeks then 1 week off provider):  Prochloperazine (Compazine) 10 mg every 6 hours and Ondansetron (Zofran) 8 mg every 8 hours  Take as needed    Helpful hints during cancer treatment:    Diet:  o Avoid greasy or spicy foods on days surrounding chemotherapy  o Eat small frequent sunlight.  o Wear a broad-spectrum sunscreen with an SPF of 30 or higher on any skin exposed to the sun. Re-apply every 2 hours if in the sun and after bathing or sweating. o For dry skin, use an alcohol-free lotion twice per day, especially after baths. http://www.juares.info/. html      Safety and Handling of Chemotherapy  While you or your family member is receiving chemotherapy, whether in the clinic or at home, the following precautions must be taken to lessen any exposure to the m medications and for several months or years after therapy. Chemotherapy can have harmful side effects to the fetus, especially in the first trimester.   In addition, menstrual cycles can become irregular during and after treatment, so you may not know if y

## 2018-10-29 NOTE — PROGRESS NOTES
RADIATION ONCOLOGY COMPLETION SUMMARY NOTE    DIAGNOSIS : Metastatic bladder cancer, with symptomatic bone metastases in manubrium/R shoulder and thoracic spine involvement, s/p palliative radiotherapy on 10/29/2018.     Dear Gabo Marlow and colleagues,    As chemotherapy with carbo/gem per Dr. Latonya Simpson. He will continue his pain meds and ideally pt will respond to systemic tx and will continue TPN. Thank you very much for allowing us to take care of your patient.   Please do not hesitate to contact us with

## 2018-10-30 NOTE — TELEPHONE ENCOUNTER
Keith Jones called to speak with Justin concerning his cheomtherapy. I spoke to Jerald, to get him scheduled,Nidia stated,she will call him back. Keith Jones is very frustrated, and said he will be out of the house away from his phone.  Keith Jones can be reached at [phone] Please Advise

## 2018-11-02 NOTE — PROGRESS NOTES
Pt here for C1D1 Carbo/Gemzar. Arrives Ambulating independently    Modifications in dose or schedule: No. Pt tolerated infusions well without any s/s of adverse reaction noted.       Frequency of blood return and site check throughout administration: Prior

## 2018-11-02 NOTE — PATIENT INSTRUCTIONS
Cancer Treatment Side Effects (refer to [address] for further information):   Allergic reactions  Constipation  Diarrhea  Fatigue  Forgetfulness  Hair loss  Heart effects  Kidney / Bladder effects  Liver effects  Loss of appetite  Low red blood ce you need to. Do what you can when you feel up to it. Oral Care  Keep your mouth clean.   Rinse your mouth before and after meals with plain water or with a mild mouth rinse (made with 1 quart water, 1 teaspoon salt, and 1 teaspoon baking soda, shake bef new cough  · Excessive tiredness or weakness, confusion or loss of balance  · New rash  · Tingling or burning, redness, swelling of the palms of hands or soles of feet  · Sudden new unexpected symptom –such as change in vision, swelling in arm or leg  · In and for 48 hours after the last dose. 5. Wash your hands after using the toilet. 6. Wash any skin area that has come in contact with urine or stool.       Safety for my family and friends:  1.  Due to safety concerns and the nature of this treatment envir

## 2018-11-06 PROBLEM — N17.9 ACUTE RENAL FAILURE (ARF) (HCC): Status: ACTIVE | Noted: 2018-01-01

## 2018-11-06 PROBLEM — R11.2 NAUSEA & VOMITING: Status: ACTIVE | Noted: 2018-01-01

## 2018-11-06 NOTE — PROGRESS NOTES
Van to infusion today as an add-on labs and hydration after an acute-care visit with KELLI Martinez. He arrives alert and via wheelchair. He is accompanied by his daughter. He is in pain to his lower right back/buttock area due to bone mets.  He receiv RAIZA and Cait Chahal RN.

## 2018-11-06 NOTE — TELEPHONE ENCOUNTER
Toxicities:  C1 D1 Carboplatin/Gemcitabine on 11/2/2018    Decreased urine output/Nausea/Vomiting/Diarrhea/Dehydration    Fever: Grade 0(Denies c/s/f. He reports his ileo conduit is putting less urine. The urine is a dark yellow urine. No hematuria. He reports he saw blood in his urine on Saturday. He denies seeing any clots.)  Nausea: Grade 2(Nausea that started yesterday in the morning. Taking compazine 1 - 2 times. Not taking it every 6 hrs. He has not taken any today. Neeru Roa will try to have him take one now. NPO on TPN. Pt reports he has not connected his TPN last night. He slept through it.)  Vomiting: Grade 2(Van reports he has been vomiting every time he tries to take compazine or his morphine. He reports he has vomited 3-5 times since yesterday morning.)  Dehydration: Grade 1(Van is NPO on TPN. He takes his meds with sips of water. Did not connect his TPN last night. Has not been able to take in any water with meds. Agnieszka Zach reports he feels weak. He currently denies feeling light headed or dizzy.)  Diarrhea: Grade 2(Van reports he always has liquid stool in his ostomy bag. Since Midnight he has emptied his bag 5-6 times. He reports his stool is a brown/green color. He denies seeing any blood in his stool. He has not taken any imodium. Neeru Roa will try to have him take 2 imodium now.)    Pt booked for an acute care appt with KELLI Anderson at 9:30am. I updated Enma in Infusion. She has an RN that can draw labs & provide hydration if needed.  She will await orders from [place], [place]

## 2018-11-06 NOTE — TELEPHONE ENCOUNTER
Renetta Middleton called to speak with the nurse concerning [facility]. [facility] is vomiting and has diarrhea.  Renetta Middleton is very concerned can be reached at [phone]  Please Advise

## 2018-11-06 NOTE — H&P
[facility]    PATIENT'S NAME: Nena Spivey PHYSICIAN: Danielle Ascencio MD   PATIENT ACCOUNT#:   [id number]    LOCATION:  [address] [place] [address] #:   [id number]       YOB: 1950  ADMISSION DATE:       11/06/2018 a surgical repair for that considering his metastatic disease. Oncology surgical service at [address] [place] [address] approved him for TPN.   Also, approved him to start palliative chemotherapy with gemcitabine and carboplatin, which was started on Friday, Nov mid-abdomen, with dark turbid urine, concentrated in texture. 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 Positive bowel sounds. No tenderness. EXTREMITIES:  No peripheral edema, clubbing, or cyanosis.    Aditi Repress

## 2018-11-06 NOTE — PROGRESS NOTES
S:  70year old male presents today with c/o nausea, vomiting, green-colored diarrhea and weakness since yesterday. Due to nausea and vomiting, he has not been able to keep his antiemetic and opioid medications down.   The majority of his cancer-related p

## 2018-11-07 PROBLEM — Z71.89 ADVANCED CARE PLANNING/COUNSELING DISCUSSION: Status: ACTIVE | Noted: 2018-01-01

## 2018-11-07 PROBLEM — Z71.89 GOALS OF CARE, COUNSELING/DISCUSSION: Status: ACTIVE | Noted: 2018-01-01

## 2018-11-07 NOTE — CONSULTS
[facility] Patient Status:  Inpatient    1950 MRN [id number]   Location [facility] 4W/SW/SE Attending Bertrand Reyes, [address] [facility] Day # 1 PCP Barbara Diehl.  MD Vernell     Date of converted to an ileal conduit, he had wound dehiscence and ileocutaneous fistula and so he he has been on TPN for the past three months and only able to drink up to 2 cups of water per day along with taking pills by mouth, hypertension, osteoarthritis, JOSE Friday and Tuesday. He denies any problems with swallowing. He has had no problems with constipation (he has an ileostomy). There is no SOB. Functional Status: Prior to admission, he was living by himself in a single family home.  He is independent use of morphine at home and difficulty taking pills, will start Fentanyl patch at 50 mcg. Given chronic kidney insufficiency, will discontinue morphine and start Dilaudid PO PRN for breakthrough pain. Discussed with Dr. Kadie Vieira prior to entering orders. injection 2 mg, 2 mg, Intravenous, Q2H PRN **OR** morphINE sulfate (PF) 4 MG/ML injection 4 mg, 4 mg, Intravenous, Q2H PRN  •  influenza virus vaccine (FLUAD) ages 72 years and older inj 0.5ml, 0.5 mL, Intramuscular, Prior to discharge  •  Loperamide HCl ( bilateral trace  General color: pale  Neurologic: level of consciousness-alert  Orientation: x 4  Appearance: appropriately groomed  Affect: normal  Judgment: normal    Palliative Performance Scale : 60%    Palliative Care Goals of Care:  Patient: knows di 60%    Emotional support provided to patient/family today: Yes    Palliative Care Follow Up:    A total of 75 minutes were spent on this consult, which included all of the following:direct face to face contact, history taking, physical examination, and >50

## 2018-11-07 NOTE — PROGRESS NOTES
[facility]    Hematology/Oncology   Progress Note    Magnus Robbins Patient Status:  Inpatient    1950 MRN [id number]   Location [facility] 4W/SW/SE Attending Tim Doyle, [address] [facility] Day # 1 PCP Laurita Malone.  MD Vernell consult. Possibly home tomorrow.      Isaac Dietrich MD

## 2018-11-07 NOTE — CONSULTS
[facility]    PATIENT'S NAME: Gordon Briscoe   ATTENDING PHYSICIAN: Amor Jo MD   CONSULTING PHYSICIAN: Liza Rodriguez.  Karin Sheets MD   PATIENT ACCOUNT#:   [id number]    LOCATION:  [address] [place] [address] #:   [id number]       DATE OF BIRTH:  0 Sheppard's esophagus; anemia; chronic wound dehiscence following surgery, as noted above; basal-cell carcinoma of the back; history of hydronephrosis. MEDICATIONS:  Heparin, morphine, sodium chloride, acetaminophen, Imodium, Zofran.       ALLERGIES:  Co kidney injury. He also has anemia and thrombocytopenia, likely in part from chemotherapy. We will proceed with supportive measures with IV volume replacement for hypovolemia.   We will have Palliative Care see the patient in consultation to assist in ca

## 2018-11-07 NOTE — DIETARY NOTE
ADULT NUTRITION INITIAL ASSESSMENT    Pt is at high nutrition risk. Pt does not meet malnutrition criteria.       RECOMMENDATIONS TO MD:  See Nutrition Intervention     NUTRITION DIAGNOSIS/PROBLEM:  Altered GI function related to physiological causes cau Encounters:  11/06/18 : 82.8 kg (182 lb 8 oz)  11/06/18 : 83.5 kg (184 lb)  11/06/18 : 83.5 kg (184 lb)  10/25/18 : 87.5 kg (193 lb)  10/22/18 : 88.5 kg (195 lb)  10/22/18 : 86.2 kg (190 lb)  10/04/18 : 87 kg (191 lb 12.8 oz)  10/02/18 : 86.6 kg (191 lb) [address] [facility], [address] [place] [address] Dietitian [id number]

## 2018-11-07 NOTE — PROGRESS NOTES
[place] [facility]    Progress Note    Zacarias rBandt Patient Status:  Inpatient    1950 MRN [id number]   Location [facility] 4W/SW/SE Attending Bertrand Reyes, [address] [facility] [address] Day # 1 PCP Barbara Diehl.  MD Vernell       Subjective:   Nikolas Hagan years and older inj 0.5ml 0.5 mL Intramuscular Prior to discharge   Loperamide HCl (IMODIUM) cap 4 mg 4 mg Oral QID PRN   Ondansetron HCl (ZOFRAN) tab 8 mg 8 mg Oral Q8H PRN   Prochlorperazine Maleate (COMPAZINE) tab 10 mg 10 mg Oral Q6H PRN       Lab Resu Liam Skill, DO  >35 min spent with patient. > 50% time was spent counseling patient, discussing plan of care, discussing labs and imaging findings. Spoke with consultant. All questions answered.          [id number]     **Certification      PHYSI

## 2018-11-08 NOTE — PROGRESS NOTES
[place] [facility] - [facility]    Hematology/Oncology   Progress Note    Zacarias Brandt Patient Status:  Inpatient    1950 MRN [id number]   Location [facility] 4W/SW/SE Attending Bertrand Reyes, [address] [facility] Day # 2 PCP Barbara Diehl.  MD Vernell --appreciate palliative consult. [phone] Namita Caldwell for home today.       Hieu Sierra MD

## 2018-11-08 NOTE — CONSULTS
[place] [facility] - [facility]  Palliative Care Follow Up    Magnus Robbins Patient Status:  Inpatient    1950 MRN [id number]   Location [facility] 4W/SW/SE Attending Tim Doyle, 160[address] [facility] Day # 2 PCP Laurita Malone.  MD Vernell     Date of Consul data filed at 11/8/2018 0600  Gross per 24 hour   Intake 2172.83 ml   Output 3750 ml   Net -1577.17 ml       Physical Exam:  General: acutely ill, chronically ill, mostly comfortable  Nutritional status: normally nourished  HEENT: + eye glasses, no focal d 8 mg, 8 mg, Oral, Q8H PRN  •  Prochlorperazine Maleate (COMPAZINE) tab 10 mg, 10 mg, Oral, Q6H PRN    Prior to Admission Medications:  [START ON 11/10/2018] fentaNYL 50 MCG/HR Transdermal Patch 72 Hr Place 1 patch onto the skin every third day.    HYDROmorp examination       Hypovolemia       Nausea vomiting and diarrhea       Cancer related pain  -Continue Fentanyl patch 50 mcg TD Q 72 hours (Dr. Tricia Nguyen left script in chart in anticipation of discharge today #10 patches)  -Continue Dilaudid 8 mg PO Q 2 hours

## 2018-11-08 NOTE — DISCHARGE SUMMARY
[place] [facility] - [facility]    Discharge Summary    Nick Mendez Patient Status:  Inpatient    1950 MRN [id number]   Location [facility] 4W/SW/SE Attending No att. providers found   Hosp Day # 2 PCP Taran Arreguin.  MD Vernell     Date of Admissi discussion      Reason for Admission: n/v     Physical Exam:   General appearance: alert, appears stated age and cooperative  Pulmonary:  clear to auscultation bilaterally  Cardiovascular: S1, S2 normal, no murmur, click, rub or gallop, regular rate and rh               Consultations: Dr Sugar Chen    Procedures: none    Complications: none    Discharge Condition: Good    Discharge Medications:      Discharge Medications      START taking these medications      Instructions Prescription details   fentaNYL 50 MCG 35 min

## 2018-11-09 NOTE — TELEPHONE ENCOUNTER
Pt discharged from [facility] AND [facility] on 11/8/18 . Please call to schedule follow up with Primary Care Physician.    Thanks

## 2018-11-12 NOTE — TELEPHONE ENCOUNTER
Per Michelle's request, I called Nemesio Odonnell & updated him that Dr Oscar Norris recommends he call Palliative Care regarding his pain management. Nemesio Odonnell said he feels his pain is being managed right now by the current pain medication he is on.  I updated him Dr Oscar Norris w

## 2018-11-12 NOTE — TELEPHONE ENCOUNTER
Van calling with hip and buttocks pain. He said Dr Maria Elena Pham was going to start immuno therapy. Pain scale 81/2 - 9. Has been taking pain medication.  selma

## 2018-11-12 NOTE — TELEPHONE ENCOUNTER
Toxicities:  C1 D 1 Carboplatin/Gemcitabine HCL on 11/2/2018    Right Hip & Buttock pain    Joanna Loach reports he continues to have a constant throbbing/sharp right hip pain radiating to his right buttock.  He reports pain when he is changing position or sitti

## 2018-11-13 NOTE — TELEPHONE ENCOUNTER
Per Michelle's request, I attempted to reach Nikolas Hagan to book a Rad/Onc consultation appt with Dr Yue Lopez RN (New bone mets to right hip/pelvis). No answer. I left a voice mail message asking him to please return my call.  I am holding a tentative Rad/Onc

## 2018-11-13 NOTE — PROGRESS NOTES
Cancer Center Progress Note    Patient Name: Nick Mendez   YOB: 1950   Medical Record Number: [id number]   Attending Physician: Liya Lowery M.D.      Chief Complaint:  Metastatic bladder cancer with bone metastasis    History of Present [place] Status:  ECOG 1  Past Medical History:  Past Medical History:   Diagnosis Date   • Abscess 2013    s/p neobladder   • Anemia, iron deficiency 2008   • Sheppard's esophagus    • Basal cell carcinoma    • Bladder cancer [facility] 2013    Bladder Surgery- Neobladd Concern: Not Asked        Stress Concern: Not Asked        Weight Concern: Not Asked        Special Diet: Not Asked        Back Care: Not Asked        Exercise: Not Asked        Bike Helmet: Not Asked        Seat Belt: Not Asked        Self-Exams: Not Aske palpable distal pulses   Abdomen: Open surgical wound no erythema or drainage  Extremities: No edema. Neurological: 5/5 motor x4.         Laboratory:  Recent Labs   Lab  11/08/18   0605   WBC  1.3*   HGB  7.4*   PLT  54*   NE  1.0*         Lab Results   Co manage pain medications for cancer related pain. Hold chemo for now depending on plan for XRT. –iron deficiency anemia noted. on TPN with supplementation      The patient's emotional well being was assessed and resources were discussed.   Appropriate

## 2018-11-13 NOTE — TELEPHONE ENCOUNTER
I received a staff message and a VM from Kevin Jimenez that patient is experiencing increased pain. I called and spoke with Suman Suggs. I last saw him on 11/8/2018 while he was hospitalized here. I offered him an appointment tomorrow but he declined.

## 2018-11-14 NOTE — TELEPHONE ENCOUNTER
S/w Carolina Graves. She states that yesterday her father was in better spirits given that a treatment plan was in place. She did not expect him to be sad or depressed today since a \"plan\" makes him hopeful.   She states that Josie could not get through telepho

## 2018-11-14 NOTE — TELEPHONE ENCOUNTER
In follow-up to my phone call with this patient from yesterday, I checked the [place] . Patient was discharged from the hospital on 11/9/2018. He should have applied his first Fentanyl patch on 11/10/2018.  Then, he should have applied the next patch on 11/13

## 2018-11-14 NOTE — TELEPHONE ENCOUNTER
Mya Roman is calling because she said she really doesn't know what to do. She said her father is really depressed today, She said she doesn't know if the doctor can give him some antidepressant or something but she is really worried.  He does have a appointment

## 2018-11-14 NOTE — TELEPHONE ENCOUNTER
Condition Update:  Depression    Cintia Grimes was at yesterday's appt to discuss Van's treatment plan.  After they left Dr Denita James seemed to be in good spirits Today Cintia Grimes reports [place] has been tearful & saying he \"does not know how much more he can t

## 2018-11-15 PROBLEM — R11.2 INTRACTABLE VOMITING WITH NAUSEA, UNSPECIFIED VOMITING TYPE: Status: ACTIVE | Noted: 2018-01-01

## 2018-11-15 PROBLEM — C79.51 BLADDER CANCER METASTASIZED TO BONE (HCC): Status: ACTIVE | Noted: 2018-01-01

## 2018-11-15 PROBLEM — F32.A DEPRESSION: Status: ACTIVE | Noted: 2018-01-01

## 2018-11-15 PROBLEM — C67.9 BLADDER CANCER METASTASIZED TO BONE (HCC): Status: ACTIVE | Noted: 2018-01-01

## 2018-11-15 NOTE — ED NOTES
Repositioned for comfort, call light within reach. Family at cartside. Updated about status, awaiting results.

## 2018-11-15 NOTE — ED PROVIDER NOTES
Patient Seen in: [facility] Emergency Department    History   Patient presents with:  Nausea/Vomiting/Diarrhea (gastrointestinal)    Stated Complaint: n/v    HPI    The patient is a 39-year-old male with a history of metastatic bladder cancer who All other systems reviewed and negative except as noted above.     Physical Exam     ED Triage Vitals   BP 11/15/18 0709 119/53   Pulse 11/15/18 0709 95   Resp 11/15/18 0709 24   Temp 11/15/18 0709 99.1 °F (37.3 °C)   Temp src 11/15/18 0709 Oral   SpO2 within normal limits   CBC W/ DIFFERENTIAL - Abnormal; Notable for the following components:    WBC 1.8 (*)     RBC 3.52 (*)     HGB 8.9 (*)     HCT 27.7 (*)     MCV 78.6 (*)     MCH 25.2 (*)     RDW 19.6 (*)     PLT 78 (*)     Neutrophil Absolute 1.5 (*) reassessment of your blood pressure.     Medications Prescribed:  Current Discharge Medication List        Present on Admission  Date Reviewed: 11/13/2018          ICD-10-CM Noted POA    Nausea & vomiting R11.2 11/6/2018 Unknown

## 2018-11-15 NOTE — H&P
[facility] Patient Status:  Observation    1950 MRN [id number]   Location [facility] 1W Attending Chidi Mcgee MD   Hosp Day # 0 PCP Laurita Malone.  Deepti Schofield MD     Date:  11/15/2018  Da Smoking status: Never Smoker      Smokeless tobacco: Never Used    Alcohol use: Yes      Comment: rare occasion    Drug use: No    Allergies/Medications:    Allergies:   Radiology Contrast *    OTHER (SEE COMMENTS)    Comment:Pt has neobladder    Medication 11/15/2018     (H) 11/15/2018    CA 8.7 11/15/2018    ALB 2.7 (L) 10/29/2018    ALKPHO 811 (H) 10/29/2018    BILT 1.7 (H) 10/29/2018    TP 6.6 10/29/2018    AST 35 10/29/2018    ALT 33 10/29/2018    PTT 28.1 03/27/2012    INR 1.0 03/27/2012    PT 12

## 2018-11-15 NOTE — CONSULTS
[facility] Patient Status:  Emergency    1950 MRN [id number]   Location [address] Attending Vaishali Phan MD   Hosp Day # 0 PCP Fortino Matt MD neobladder formation), in mid 2018 he was found to have recurrent metastatic bladder cancer along with metastatic disease to the left clivus with lateral eye palsy which improved with localized RT, progressive renal failure with hydronephrosis and so the n also says that Karen Sharma has been forgetful the past few days. Karen Sharma admits to feeling impatient that he hasn't been seen by the Radiation Oncologist yet. \"Just fix it already. \"    He admits to feeling depressed.  He denied SI, but family says that he has facility-administered medications for this encounter. Prior to Admission Medications:  HYDROmorphone HCl 8 MG Oral Tab Take 1 tablet (8 mg total) by mouth every 2 (two) hours as needed for Pain. Please fill. For increasing cancer-related pain.    Cordelia deficits  Chest appearance: normal  Cardiac: deferred  Lungs: Normal excursions and effort.   Abdomen: deferred  : + ileal conduit and ileostomy  Peripheral edema: ankle bilateral trace  General color: pale  Neurologic: level of consciousness-sleepy  Orie physical examination, and >50% was spent counseling and coordinating care. Discussed today's visit with Dr. Ly Lema. Also awaiting return phone call from Dr. Aliza Carty. Will also contact Dr. Kayla Waterman.      Thank you for allowing Palliative Care Services team to

## 2018-11-15 NOTE — ED NOTES
Patient's family states Fentanyl was increased and he was instructed to apply 2 patches Wednesday morning. Second Fentanyl patch removed by Dr. Angel Sparks, patient and family in agreement, and disposed of with this RN.

## 2018-11-15 NOTE — WOUND PROGRESS NOTE
WOUND CARE NOTE      PLAN   Recommendations:  Dr. Maria Elena Pham  currently on consult  Palliative care nurse is following the pt.    Dietary consult for recommendations for nutrition to optimize wound healing, the pt. has TPN at home  Turn schedules  Heels elevate Ostomy:  Type: Ileal Conduit  Location of Stoma: RLQ  Stoma Appearance: pink, moist, flush with the skin, stoma is 1 inch in size  Peristomal skin: clean, intact  Appliance: One piece urostomy appliance, small Jaspreet seal, stomahesive paste , skin pre

## 2018-11-15 NOTE — WOUND PROGRESS NOTE
Wound Care Services  Received call from the nurse, the urostomy appliance is leaking, the pt. is asleep in bed, no family here, noted leaking at 9 o'clock, changed the appliance, used paste at 3 and 9 o'clock with a large Jaspreet ring and one piece urostomy

## 2018-11-15 NOTE — ED NOTES
Patient's appointment with his radiology/oncology physician cancelled for today per patient's request.

## 2018-11-16 NOTE — CONSULTS
[facility]    PATIENT'S NAME: Melida Arminda   ATTENDING PHYSICIAN: Medina Bianchi MD   CONSULTING PHYSICIAN: Simran Recio.  MD Nadine   PATIENT ACCOUNT#:   [id number]    LOCATION:  [address] [place], [place] [address] [facility] RECORD #:   [id number]       DATE OF BIRTH:  0 esophagus, anemia, chronic wound dehiscence following surgery as noted above, basal cell carcinoma of the back, history of hydronephrosis. MEDICATIONS:  Dexamethasone, morphine, ondansetron, hydromorphone as needed, fentanyl patch.     ALLERGIES:  Contra thrombocytopenia from previous chemotherapy that are improving. We discussed goals of care at length. He does want to continue with cancer treatment. We offered assistance with psychiatry to consider medications for his anxiety/depression.   However, carmen

## 2018-11-16 NOTE — DISCHARGE SUMMARY
[facility]    Discharge Summary    Margarito Duverney Patient Status:  Observation    1950 MRN [id number]   Location [facility] 4W/SW/SE Attending Xavier Christian MD   Hosp Day # 0 PCP Jer Woodall.  MD Vernell     Date of Admiss and vomiting    Physical Exam:    11/16/18  0500   BP: 137/37   Pulse: 93   Resp: 18   Temp: 97.7 °F (36.5 °C)   General: NAD. Alert and oriented x 3. HEENT: Dry mucous membranes. EOM-I. PERRL  Neck: No lymphadenopathy. No JVD. No carotid bruits.   Respir back  Hx of hydronephrosis    Stable for discharge     Consultations:   Oncology, Rad Onc, Palliative care    Procedures: None    Complications: none    Discharge Condition: stable    Discharge Medications:      Discharge Medications      START taking thes tablet  Refills:  3           Where to Get Your Medications      Please  your prescriptions at the location directed by your doctor or nurse    Bring a paper prescription for each of these medications  · dexamethasone 4 MG tablet  · HYDROmorphone HC

## 2018-11-16 NOTE — CM/SW NOTE
ASHISH met with the pt. At bedside. The pt. Lives alone in a single family home. The pt. Reports being independent prior to admission with adls, ambulation and driving. The pt. Is current with his ileostomy and colostomy care at home. The pt.  Is current wi

## 2018-11-16 NOTE — CONSULTS
[facility]  Palliative Care Follow Up    Ar Suarez Patient Status:  Observation    1950 MRN [id number]   Location [facility] 4W/SW/SE Attending Naheed Fuentes MD    0 PCP Disha Guerra.  Victoria Blackwood MD     Date of Consult:  Level of pain: 0-1/10    Intake and Output:    Intake/Output Summary (Last 24 hours) at 11/16/2018 1026  Last data filed at 11/16/2018 0802  Gross per 24 hour   Intake —   Output 3250 ml   Net -3250 ml       Physical Exam:  General: chronically ill, comfor CA 8.7 11/15/2018    ALB 2.7 (L) 10/29/2018    ALKPHO 811 (H) 10/29/2018    BILT 1.7 (H) 10/29/2018    TP 6.6 10/29/2018    AST 35 10/29/2018    ALT 33 10/29/2018    PSA 0.0 07/01/2018    MG 2.1 11/07/2018    PHOS 2.6 11/08/2018       Imaging:          Ja Ly coordinating care. Discussed today's visit with RN Aurora Montgomery. Thank you for allowing the Palliative Care Team to participate in the care of your patient. Will sign off as discharge is anticipated for today.      Mateo MCWILLIAMSC, AGPCNP-BC, ACHPN, [id number]

## 2018-11-19 PROBLEM — K92.2 GASTROINTESTINAL HEMORRHAGE: Status: ACTIVE | Noted: 2018-01-01

## 2018-11-19 PROBLEM — K92.2 GASTROINTESTINAL HEMORRHAGE, UNSPECIFIED GASTROINTESTINAL HEMORRHAGE TYPE: Status: ACTIVE | Noted: 2018-01-01

## 2018-11-19 NOTE — ED INITIAL ASSESSMENT (HPI)
Patient here today after he noticed blood and possibly clots in his drainage bag. Patient has history of cancer, a bladder/bowel fistula and is supposed to start radiation treatments tomorrow. Denies dizziness, cp, sob.

## 2018-11-19 NOTE — H&P
[facility]    PATIENT'S NAME: Hui James PHYSICIAN: Moraima Loja MD   PATIENT ACCOUNT#:   [id number]    LOCATION:  [place] [address]  MEDICAL RECORD #:   [id number]       YOB: 1950  ADMISSION DATE:       11/19/20 and he is currently on TPN. He has been approved or palliative chemotherapy with gemcitabine and carboplatin, which has been given by Dr. Luis Greene.       PAST MEDICAL HISTORY:  He had a history of hypertension, osteoarthritis, gastroesophageal reflux disease, source is not clear small bowel or gastric. The patient is currently on Decadron. We will start him on IV Protonix. 2.   Acute posthemorrhagic anemia, acute on chronic. We will give 1 unit of blood transfusion.    3.   Widely metastatic bladder cancer,

## 2018-11-19 NOTE — CM/SW NOTE
[facility] transfer center called ([phone]) No medical beds available at this time. Transfer initiated thou- face sheet faxed to [phone] for possible general medical bed later in the day.  Dr. Madison Dos Santos speaking with transfer team.

## 2018-11-19 NOTE — ED PROVIDER NOTES
Patient Seen in: [facility] AND [facility] Emergency Department    History   Patient presents with:  GI Bleeding (gastrointestinal)    Stated Complaint: blood in barton bag    HPI    69-year-old male with history of metastatic bladder cancer presents for evaluati negative except as noted above.     Physical Exam     ED Triage Vitals [11/19/18 0834]   /67   Pulse 90   Resp 20   Temp 97.7 °F (36.5 °C)   Temp src Oral   SpO2 99 %   O2 Device None (Room air)       Current:/61   Pulse 79   Temp 98 °F (36.7 °C Absolute 0.1 (*)     Anisocytosis 2+ (*)     All other components within normal limits   CBC WITH DIFFERENTIAL WITH PLATELET    Narrative: The following orders were created for panel order CBC WITH DIFFERENTIAL WITH PLATELET.   Procedure hemorrhage, unspecified gastrointestinal hemorrhage type  (primary encounter diagnosis)    Disposition:  Admit  11/19/2018 12:36 pm    Follow-up:  No follow-up provider specified.   We recommend that you schedule follow up care with a primary care provider

## 2018-11-19 NOTE — PLAN OF CARE
HEMATOLOGIC - ADULT    • Maintains hematologic stability Progressing    • Free from bleeding injury Progressing        Patient Centered Care    • Patient preferences are identified and integrated in the patient's plan of care Progressing        Patient/Fam

## 2018-11-20 NOTE — TELEPHONE ENCOUNTER
Wound Care RN called again hoping to speak with Dr Kami Fortune re: orders for wound vac. Advised Dr Kami Fortune will be in surgery for most of the day today. States she reached out to Dr Trang Tran who defers to surgery for orders on this matter.     Per Margie Rico, [facility] RN;

## 2018-11-20 NOTE — PLAN OF CARE
HEMATOLOGIC - ADULT    • Maintains hematologic stability Progressing    • Free from bleeding injury Progressing        PAIN - ADULT    • Verbalizes/displays adequate comfort level or patient's stated pain goal Progressing        RISK FOR INFECTION - ADULT

## 2018-11-20 NOTE — WOUND PROGRESS NOTE
Wound Care Services  Received a call from the pt's nurse, the pt. is being discharged tonight, I placed a call to Dr Nicolas Murray office to notify him and placed a call to [facility] to notify them that the pt.  is discharging tonight, and that the home vac if approved w

## 2018-11-20 NOTE — TELEPHONE ENCOUNTER
I spoke to Savita. Savita ask to cancel all [place] appointments on Friday 11/23/2018. His treatment plan has changed, the patient is starting radiation on Mon 11/26/2018.  Please Advise

## 2018-11-20 NOTE — WOUND PROGRESS NOTE
Wound Care Services  Spoke with Dr Hugo Pang and he is ok with the vac the abdominal wound, but needs the surgeon to follow the wound and it's care. He does not feel that there is cancerous tissue to the wound bed.  Spoke with Dr Burak Olmstead again and she would like

## 2018-11-20 NOTE — WOUND PROGRESS NOTE
Pt seen for wound vac removal. At this time pt is requesting to be discharged home without the vac to the fistula so that he can keep his radiation appointment tomorrow. Wound vac can be delivered to the home once it has been approved from insurance. a one

## 2018-11-20 NOTE — PROGRESS NOTES
[facility]    Progress Note    Keren Burkitt Patient Status:  Inpatient    1950 MRN [id number]   Location [facility] 4W/SW/SE Attending Oscar Mahmood MD   Hosp Day # 1 PCP Skylar Matt MD       SUBJECTIVE:  No CP, S 2.0 11/20/2018    PHOS 3.6 11/20/2018         Imaging: ***        Meds:     Current Facility-Administered Medications:  dextrose 10 % infusion  Intravenous Continuous PRN   Insulin Regular Human (NOVOLIN R) 100 UNIT/ML injection 1-5 Units 1-5 Units Subcuta transfusion. 3.       Widely metastatic bladder cancer, currently on palliative chemotherapy for open wound with ileocutaneous fistula. The patient will be admitted to the general medical floor, 1 unit transfusion, IV Decadron, pain control.   General Gardner ***  GI with ***    Dispo: ***    Greater than 35 minutes spent, >50% spent counseling re: treatment plan and workup      Roscoe Chen MD, MPH  79/78/7418  4:95 PM    **Certification      PHYSICIAN Certification of Need for Inpatient Hospitalization - I

## 2018-11-20 NOTE — DIETARY NOTE
ADULT NUTRITION INITIAL ASSESSMENT    Pt is at high nutrition risk. Pt does not meet malnutrition criteria.       RECOMMENDATIONS TO MD:  See Nutrition Intervention-for TPN specifics     NUTRITION DIAGNOSIS/PROBLEM:  Increased nutrient needs and altered 119 % IBW  Usual Body Wt: 225 Lbs       84 % UBW    WEIGHT HISTORY:  Patient Weight(s) for the past 336 hrs:   Weight   11/19/18 0834 86.2 kg (190 lb)     Wt Readings from Last 20 Encounters:  11/19/18 : 86.2 kg (190 lb)  11/15/18 : 84 kg (185 lb 3.2 oz Administration:      Monitor: for TPN adjustment    - Anthropometric Measurement:      Monitor: wt and wt change     - Nutrition Goals:      regain wt as able, maintain wt within 5%, TPN to meet greater than 80% nutrition needs, promote healing and improve

## 2018-11-20 NOTE — DISCHARGE SUMMARY
[facility]    Discharge Summary    Lindsay Ramirez Patient Status:  Inpatient    1950 MRN [id number]   Location [facility] 4W/SW/SE Attending Bc Sánchez MD   Hosp Day # 1 PCP Kishan Fisher.  MD Vernell     Date of Admission: Gen: No acute distress, alert and oriented x3  Pulm: Lungs clear, normal respiratory effort  CV: Heart with regular rate and rhythm  Abd: Abdomen soft, nontender, nondistended, bowel sounds present; wound dehiscience w/ wound vac  In place  Neuro:  No a Condition: Fair    Discharge Medications:      Discharge Medications      CHANGE how you take these medications      Instructions Prescription details   HYDROmorphone HCl 8 MG Tabs  Commonly known as:  DILAUDID  What changed:  Another medication with the s

## 2018-11-20 NOTE — TELEPHONE ENCOUNTER
Wound Care RN calling for orders for wound vac for pt. Would like Dr See Farias to order. Advised  currently in surgery but I will attempt to contact for advice. Called OR, talked to Angie Jameson who relayed information to Dr See Farias.   Wound Care RN should

## 2018-11-20 NOTE — WOUND PROGRESS NOTE
Wound Care Services  Consulted to see the pt's leaking mid abdominal fistula wound, the pt. is known to wound care services.  The ileal conduit appliance is on and intact urine is clear, the mid abdominal fistula wound is clean, with pink tissue, and yellow

## 2018-11-20 NOTE — WOUND PROGRESS NOTE
WOUND CARE NOTE      PLAN   Recommendations:  Dr. Jessica Francisco, Dr Violette Guillory and Dr See Farias are following the pt. Dietary consult for recommendations for nutrition to optimize wound healing- Following the pt.    VAC standing orders  VAC troubleshooting instructions received orders to try a wound vac, The pouch was removed and the skin was cleansed, skin prep applied to the aj wound, I applied a non adherent mepitel one over the entire wound, placed a piece of vac sponge over the non adherent and bridged the vac dis

## 2018-11-20 NOTE — WOUND PROGRESS NOTE
Wound Care Services  Spoke with the pt. and Dr Kendrick Lynn also seeing the pt., he saw the pt,. this am, I will call Dr Kendrick Lynn to discuss trying a vac dressing to the pt's abdominal wound. The pt. would like to try the vac.   I also called Dr Ruben Bruno nurse Lee dela cruz

## 2018-11-20 NOTE — PROGRESS NOTES
Notified of admit. Will provide consult in AM. GI blood loss with slight worsening of anemia. Widely metastatic bladder ca with bone mets. On palliative treatment.   Most pressing recent issue has otherwise been pain control and was to start palliative XR

## 2018-11-20 NOTE — CONSULTS
[facility] Surgical Oncology    Patient Name:  Nick Mendez   YOB: 1950   Gender:  Male   Appt Date:  11/19/2018   Provider:  No name on file. Insurance:  MEDICARE PART A&B     PATIENT PROVIDERS  Referring Provider: No ref.  provider found I was asked to evaluate Mr. 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 Eddie Iyer of [facility]. Patient was later evaluated by Dr. Stanley Jackson at the [address] [place] [address] who emphasized that surgery is of course a major undertaking with many potential complications. Transportation needs - medical: Not on file      Transportation needs - non-medical: Not on file    Occupational History      Not on file    Tobacco Use      Smoking status: Never Smoker      Smokeless tobacco: Never Used    Substance and Sexual Activi Hematological: Does not bruise/bleed easily. Psychiatric/Behavioral: Negative for confusion and agitation. Physical Examination:  Physical Exam    Constitutional: He is oriented to person, place, and time.  He appears well-developed and well-nourish 1.  With regard to the enterocutaneous fistula, I had a long conversation with Adan Mullen and his family about the treatment options. I of course agree with Dr. Jennifer Carr that surgery would be a major undertaking and is not guaranteed to be successful.   He agree

## 2018-11-20 NOTE — CM/SW NOTE
Addend 444p:     Wound care RN informed SW that pt will discharge home today 11/20, without wound vac. Wound care nurses have made referral to [facility] for wound vac to be delivered to pt's home once approved.  Wound care nurse requested that home RN is able to TPN. ASHISH sent clinical updates to [address] [place] [address] Dr Sheth.      *Resume C orders needed prior to discharge*    PLAN: Home w/ [address] [place] [address] Dr Sheth for [address] [facility], [place]

## 2018-11-21 NOTE — CONSULTS
[facility]    PATIENT'S NAME: Levelpasha Lowry   ATTENDING PHYSICIAN: James Lau. Thais Shah MD   CONSULTING PHYSICIAN: Jose R Mccoy MD   PATIENT ACCOUNT#:   [id number]    LOCATION:  [address] [place], [place] [address] [address] RECORD #:   [id number]       DATE OF BIRTH: met with General Surgery with regard to his chronic wounds. He has a VAC in place. He denies any new sites of bone pain. He denies any fevers or chills. He denies any other sites of bruising or bleeding. He denies any focal neurological deficits.     P blood loss with anemia; however, his hemoglobin is relatively stable now with transfusion. He has had no further evidence of GI blood loss. We appreciate the consultations of our surgical colleagues.   We will defer to them on any management of his wounds

## 2018-11-21 NOTE — TELEPHONE ENCOUNTER
Pt discharged from [facility] on 11/20/18 . Please call to schedule follow up with Primary Care Physician.    Thanks

## 2018-11-21 NOTE — TELEPHONE ENCOUNTER
Spoke with Momo Gay regarding labs and MD visit with Dr Andrea Mcknight for Tuesday.   He verbalizes understanding

## 2018-11-26 NOTE — PROGRESS NOTES
[facility] Radiation Treatment Management Note 6-10    Patient:  Liliya Mojica  Age:  70year old  Visit Diagnosis:    1.  Bone metastases ([address]      Primary Rad/Onc:  Dr. Heidy Palma Gardner    Site Delivered Dose (Gy) Prescribed Dose (Gy) Nevaeh Kahn

## 2018-11-26 NOTE — CM/SW NOTE
SW received an update from [facility]. The pt did not cancel service with the Ron Wilder RN, therefore [facility] was unable to service the pt. Ron Wilder gave the referral to [facility]- who is able to work with the TPN and the wound vac at home.       Mely Ruiz, LS

## 2018-11-27 ENCOUNTER — HOSPITAL (OUTPATIENT)
Dept: OTHER | Age: 68
End: 2018-11-27

## 2018-11-27 LAB
A/G RATIO_: 0.9
ALBUMIN: 3 G/DL (ref 3.5–5)
ALK PHOS: 701 UNIT/L (ref 50–124)
ALT/GPT: 149 UNIT/L (ref 0–55)
ANION GAP SERPL CALC-SCNC: 13 MEQ/L (ref 10–20)
AST/GOT: 77 UNIT/L (ref 5–34)
BILI TOTAL: 3 MG/DL (ref 0.2–1)
BUN SERPL-MCNC: 42 MG/DL (ref 6–20)
CALCIUM: 8.4 MG/DL (ref 8.4–10.2)
CHLORIDE: 103 MEQ/L (ref 97–107)
CREATININE: 1.55 MG/DL (ref 0.6–1.3)
GLOBULIN_: 3.4 G/DL (ref 2–4.1)
GLUCOSE LVL: 185 MG/DL (ref 70–99)
HEMOLYSIS 2+: NEGATIVE
HEMOLYSIS 2+: NEGATIVE
HEMOLYSIS 3+: NEGATIVE
LIPEMIC 3+: NEGATIVE
LIPEMIC 4+: NEGATIVE
MAGNESIUM LEVEL: 2.2 MG/DL (ref 1.6–2.6)
PHOSPHORUS: 3.8 MG/DL (ref 2.3–4.7)
POTASSIUM: 4.2 MEQ/L (ref 3.5–5.1)
SODIUM: 139 MEQ/L (ref 136–145)
TCO2: 27 MEQ/L (ref 19–29)
TOTAL PROTEIN: 6.4 G/DL (ref 6.4–8.3)

## 2018-11-27 NOTE — PROGRESS NOTES
Cancer Center Progress Note    Patient Name: Kenny Grajeda   YOB: 1950   Medical Record Number: [id number]   Attending Physician: Danica tGz M.D.      Chief Complaint:  Metastatic bladder cancer with bone metastasis    History of Present [place] 2013    s/p neobladder   • Anemia, iron deficiency 2008   • Sheppard's esophagus    • Basal cell carcinoma    • Bladder cancer [facility]) 2013    Bladder Surgery- Neobladder   • Calculus of kidney    • Headache    • History of biopsy of bladder 2012   • [place] Special Diet: Not Asked        Back Care: Not Asked        Exercise: Not Asked        Bike Helmet: Not Asked        Seat Belt: Not Asked        Self-Exams: Not Asked    Social History Narrative      Not on file      Current Medications:    Current Outpatie Lab Results   Component Value Date     (H) 11/27/2018    BUN 41 (H) 11/27/2018    BUNCREA 23.3 (H) 11/27/2018    CREATSERUM 1.76 (H) 11/27/2018    ANIONGAP 11 11/27/2018    GFRNAA 39 (L) 11/27/2018    GFRAA 45 (L) 11/27/2018    CA 8.3 (L) 11 Brigida Sutherland MD

## 2018-11-29 NOTE — PROGRESS NOTES
Pt completed radiation today to bilateral pelvis. Meeting with Palliative Care nurse following RT today.  Follow up as needed per Dr Elroy Ro. Pt seen by Dr Elroy Ro.

## 2018-11-29 NOTE — PATIENT INSTRUCTIONS
Please call me at least 1 week prior to needing your next prescription refill.  Otherwise, call sooner with any questions or concerns. [phone]    Fentanyl Patches    Patient Information on the Use of Fentanyl Patches  This information will be useful Fentanyl patches provide a background of pain relief. As well as the patch you will also be given a quick acting pain killer in case you have pain despite the patch.  Reasons you might get pain include;  • When you use fentanyl patches for the first time, o DO NOT apply direct heat to the area with the patch on e.g. hot water bottle, sauna, electric blanket, heat pad or excessive sun exposure. Make sure that the patch is stuck safely to the skin, checking the edges.   Seek medical advice if you develop a temp

## 2018-11-29 NOTE — PATIENT INSTRUCTIONS
Follow up as needed per Dr Abbasi Damascus. Please call with any questions or concerns, Robina's direct phone # [phone].

## 2018-11-29 NOTE — PROGRESS NOTES
[facility]  Palliative Care Follow Up    Radhamihaela Philip Patient Status:  Medical Oncology Series    1950 MRN [id number]   [place] [address] Provider SHANON Heart     PCP Teresita Feranndez disease to the left clivus with lateral eye palsy which improved with localized RT, progressive renal failure with hydronephrosis and so the neobladder was discontinued and converted to an ileal conduit, he had wound dehiscence and ileocutaneous fistula an has two children: daughter Nataliia Otto and son Juliana Flynn. He is retired from work in electrical defense systems. He is of [address] [facility].      Goals of Care discussion/Advance Care Planning counseling/discussion: Deferred today    Written patient education materials Component Value Date    PT 12.7 03/27/2012    INR 1.0 03/27/2012    PTT 28.1 03/27/2012       Chemistry:  Lab Results   Component Value Date    CREATSERUM 1.76 (H) 11/27/2018    BUN 41 (H) 11/27/2018     11/27/2018    K 4.2 11/27/2018     11/

## 2018-11-29 NOTE — PROGRESS NOTES
RADIATION ONCOLOGY COMPLETION SUMMARY NOTE    DIAGNOSIS : Metastatic bladder cancer, with symptomatic bone metastases in manubrium/R shoulder and thoracic spine involvement, and now with progression of bilateral pelvic, s/p palliative radiotherapy on 11/29 of your patient. Please do not hesitate to contact us with any questions. Veto Lesches, MD, [address] [place] [address]. [email or web address]  [phone]

## 2018-11-30 PROBLEM — A41.9 SEPSIS DUE TO URINARY TRACT INFECTION: Status: ACTIVE | Noted: 2018-01-01

## 2018-11-30 PROBLEM — N17.9 ACUTE KIDNEY INJURY (HCC): Status: ACTIVE | Noted: 2018-01-01

## 2018-11-30 PROBLEM — N39.0 SEPSIS DUE TO URINARY TRACT INFECTION (HCC): Status: ACTIVE | Noted: 2018-01-01

## 2018-11-30 PROBLEM — N39.0 SEPSIS DUE TO URINARY TRACT INFECTION: Status: ACTIVE | Noted: 2018-01-01

## 2018-11-30 PROBLEM — E87.2 METABOLIC ACIDOSIS: Status: ACTIVE | Noted: 2018-01-01

## 2018-11-30 PROBLEM — E87.20 METABOLIC ACIDOSIS: Status: ACTIVE | Noted: 2018-01-01

## 2018-11-30 PROBLEM — A41.9 SEPSIS (HCC): Status: ACTIVE | Noted: 2018-01-01

## 2018-11-30 PROBLEM — A41.9 SEPSIS DUE TO URINARY TRACT INFECTION (HCC): Status: ACTIVE | Noted: 2018-01-01

## 2018-11-30 NOTE — TELEPHONE ENCOUNTER
Spoke with daughter, Cintia Grimes. She spoke with patient at 9 am and he was \"perfectly okay. \" [place] nurse came at 10 am to place wound vac for an abdominal fistula wound (per daughter s/p bladder removal, neobladder placement and removal and no has \"intestines o

## 2018-11-30 NOTE — ED PROVIDER NOTES
Patient Seen in: [facility] Emergency Department    History   Patient presents with:  Abdomen/Flank Pain (GI/)    Stated Complaint: abd pain    HPI    The patient is a 62-year-old male with history of recurrent metastatic bladder cancer to bone vital signs reviewed. All other systems reviewed and negative except as noted above.     Physical Exam     ED Triage Vitals [11/30/18 1323]   BP (!) 205/170   Pulse 100   Resp 22   Temp 100.4 °F (38 °C)   Temp src Oral   SpO2 98 %   O2 Device None [employer] following components:       Result Value    CO2 19 (*)     BUN 44 (*)     Creatinine 2.04 (*)     Calcium, Total 8.0 (*)     BUN/CREA Ratio 21.6 (*)     Calculated Osmolality 301 (*)     GFR, Non- 32 (*)     GFR, -American 37 (*) instability due to his sepsis. This involved direct patient intervention, complex decision making, and/or extensive discussions with the patient, family, and clinical staff.     Case discussed with Dr. Courtney Mack, pulmonary/critical care and  and le

## 2018-11-30 NOTE — TELEPHONE ENCOUNTER
Laura Woods for Schedule of CT for Sunday at 0900 with arrival at 0845 to [facility] parking lot to Juan, no solid foods prior to test.  Told him I will call him back after he sees visiting nurse today regarding care of wound vac and ist schedulin

## 2018-11-30 NOTE — TELEPHONE ENCOUNTER
Spoke with Dr. Dylan Salas. Patient needs to go to ER to r/o sepsis. Notified Mya Roman of this. She verbalized understanding and agreement with plan for ER. Nursing f/u Monday.

## 2018-11-30 NOTE — H&P
[facility]    PATIENT'S NAME: José Miguel Jackson PHYSICIAN: Federico Aguero MD   PATIENT ACCOUNT#:   [id number]    LOCATION:  [place] [address]  MEDICAL RECORD #:   [id number]       YOB: 1950  ADMISSION DATE:       11/30/2 location nearby his ileal conduit. Recently, he had wound VAC application to this area, and he is currently on TPN.   He had been approved for palliative chemotherapy, started on gemcitabine and carboplatin, received 1 round but, considering his recent gas dressing with wound VAC. Positive bowel sounds. No distention. No tenderness. EXTREMITIES:  No peripheral edema, clubbing, or cyanosis. MUSCULOSKELETAL:  Normal.  NEUROLOGIC:  Motor and sensory intact. ASSESSMENT AND PLAN:    1.    Sepsis, possible

## 2018-11-30 NOTE — ED NOTES
Sepsis note:     Total volume received  2544ml (done with bolus)    Time Blood Cultures Drawn: 1403/1417    Time first antibiotic started:  1457-Zosyn completed; vanco infusing at this time    Repeat Lactate due at 1930  1st: 6.1 at 1330  2nd: 4.7 at 1630

## 2018-11-30 NOTE — ED NOTES
MD notified of low blood pressures. Sepsis bolus fluids currently infusing. Abx infusing. Tylenol given for fever.

## 2018-11-30 NOTE — TELEPHONE ENCOUNTER
Home health RN saw pt today    - pt started feeling very cold at end of home health visit   - also had a chill episode last night/not as severe     Pt is resting on the couch feeling a little better  Temp 100  Today pt received pump on his fistula     Daug

## 2018-11-30 NOTE — SEPSIS REASSESSMENT
[facility]    Sepsis Reassessment Note    /57   Pulse 107   Temp 100.4 °F (38 °C) (Oral)   Resp 26   Ht 175.3 cm (5' 9\")   Wt 84.8 kg   SpO2 95%   BMI 27.62 kg/m²      3:33 PM    Cardiac:  Regularity: Regular  Rate: Tachycardic  He

## 2018-11-30 NOTE — ED INITIAL ASSESSMENT (HPI)
Pt with bladder CA with mets to the bone. Recent wound vac placed to abd and started to have severe abd pain. Febrile at present.

## 2018-12-01 NOTE — PROGRESS NOTES
Note dictated. Septic shock. Metastatic Bladder ca. Has ileal conduit. ? UTI however difficult to interpret urine studies no completely reliable. Discussed with ICU team.   Broad spectrum iv antbx. Dr. Babak Gordon here this weekend if any questions.

## 2018-12-01 NOTE — CONSULTS
[facility]    Report of Consultation    Pastor Kennedy Patient Status:  Inpatient    1950 MRN [id number]   Location [facility] 2W/SW Attending Aby Mae MD   Hosp Day # 1 PCP Carol Monet.  MD Vernell     Date of Admission: Alcohol use: No      Frequency: Never    Drug use: No          Current Medications:    Current Facility-Administered Medications:  Vancomycin HCl (VANCOCIN) 1,250 mg in sodium chloride 0.9 % 500 mL IVPB 15 mg/kg (Adjusted) Intravenous Q24H   Meropenem (M Physical Exam:   Vital Signs:  Blood pressure 118/69, pulse 97, temperature 98.1 °F (36.7 °C), temperature source Temporal, resp. rate 16, height 175.3 cm (5' 9\"), weight 199 lb (90.3 kg), SpO2 95 %. Physical Exam   Vitals reviewed.    Constitutional: CONCLUSION:  1. Extensive bilateral perinephric fat stranding may relate to acute ascending urinary tract infection. 2. Diffuse gallbladder wall thickening and pericholecystic inflammation. Correlation for potential acute cholecystitis is suggested.   3. U CONCLUSION:  1. No significant change since previous study. No acute findings.      Dictated by (CST): Mahad Garcia MD on 12/01/2018 at 7:37     Approved by (CST): Mahad Garcia MD on 12/01/2018 at 7:38          Xr Chest Ap Portable  (

## 2018-12-01 NOTE — PROGRESS NOTES
[facility] Pharmacy Note:  Renal Adjustment for meropenem (Arpita Silva)    Myrna Philip is a 70year old male who has been prescribed meropenem (MERREM) 500 mg every 8 hrs. CrCl is estimated creatinine clearance is 28.8 mL/min (A) (based on SCr of 2.42 mg/dL (H)).

## 2018-12-01 NOTE — CONSULTS
[facility]    PATIENT'S NAME: Deirdre Oreillyirma   ATTENDING PHYSICIAN: Durand Sandifer.  Stella Purdy MD   CONSULTING PHYSICIAN: Jonathan Fernandes MD   PATIENT ACCOUNT#:   [id number]    LOCATION:  [address] [facility] Tavon Esparza #:   [id number]       DATE OF BIRTH: At the time of admission, only hydromorphone, fentanyl, Imodium, Zofran, and Compazine. His current medications include Tylenol, albumin, fluconazole, meropenem, Protonix, Zosyn, vancomycin. ALLERGIES:  Contrast dye.     SOCIAL HISTORY:  Currently a which may relate to acute ascending urinary tract infection. The patient does have bilateral hydronephrosis, right greater than left, which is a chronic radiographic finding. There is evidence for metastatic disease in the bone.   There is a nonobstructin

## 2018-12-01 NOTE — PLAN OF CARE
Problem: Patient/Family Goals  Goal: Patient/Family Short Term Goal  Patient's Short Term Goal: change wound vac.   Interventions:   - See additional Care Plan goals for specific interventions  Outcome: Progressing  Family very happy that RN was able to [place] vomiting  INTERVENTIONS:  - Maintain adequate hydration with IV or PO as ordered and tolerated  - Nasogastric tube to low intermittent suction as ordered  - Evaluate effectiveness of ordered antiemetic medications  - Provide nonpharmacologic comfort measur

## 2018-12-01 NOTE — CONSULTS
[facility] HOSP - [facility]    Report of Consultation    Davidandressa Kwaku Patient Status:  Inpatient    1950 MRN [id number]   Location [facility] 2W/SW Attending Do Ramos MD   Hosp Day # 0 PCP Xiang Patino.  MD Vernell     Date of Admission: Abscess 2013    s/p neobladder   • Anemia, iron deficiency 2008   • Sheppard's esophagus    • Basal cell carcinoma    • Bladder cancer [facility]) 2013    Bladder Surgery- Neobladder   • Calculus of kidney    • Headache    • History of biopsy of bladder 2012   • Medications Prior to Admission:  fentaNYL 50 MCG/HR Transdermal Patch 72 Hr Place 1 patch onto the skin every third day. For cancer pain. Please fill.    HYDROmorphone HCl 8 MG Oral Tab Take 1 tablet (8 mg total) by mouth every 2 (two) hours as needed INR 1.0 03/27/2012    TSH 1.80 03/02/2018    PSA 0.0 07/01/2018    MG 2.0 11/20/2018    PHOS 3.6 11/20/2018         Imaging:  Xr Chest Ap Portable  (cpt=71045)    Result Date: 11/30/2018  CONCLUSION:  1. Mild cardiomegaly.  Metastatic lesion involving th Nikolas  11/30/2018

## 2018-12-01 NOTE — CM/SW NOTE
Care Management/BPCI:    Met with patient at bedside to explain the BPCI/Medicare program. Patient agreed with phone f/u for 3 months from [facility] after discharge from [facility]. Patient was enrolled under .  BPCI/Medicare Letter and [place] with ADLs and Mobility Yes   Services in place prior to admission [address] Provider Info (current with [facility], has wound vac, TPN)   Discharge Needs   Anticipated D/C needs Home health care   Choice of HHC/SNF/HOSPICE   Informed of r

## 2018-12-01 NOTE — PLAN OF CARE
CARDIOVASCULAR - ADULT    • Maintains optimal cardiac output and hemodynamic stability Progressing    • Absence of cardiac arrhythmias or at baseline Progressing    Patient on heart monitor, on Levo at time of note    GASTROINTESTINAL - ADULT    • Minimal

## 2018-12-01 NOTE — PROGRESS NOTES
[facility] HOSP [facility]      Sepsis Reassessment Note    BP 92/46 (BP Location: Left arm)   Pulse 111   Temp 100.3 °F (37.9 °C) (Temporal)   Resp (!) 0   Ht 5' 9\" (1.753 m)   Wt 193 lb 4.8 oz (87.7 kg)   SpO2 95%   BMI 28.55 kg/m²      8:02 PM    Car

## 2018-12-01 NOTE — PROGRESS NOTES
[facility] Pharmacy Note:  Renal Adjustment for Fluconazole     Claire Elias is a 70year old male who has been prescribed fluconazole  100 mg every 24 hrs. CrCl is estimated creatinine clearance is 34.2 mL/min (A) (based on SCr of 2.04 mg/dL (H)).  so the dose

## 2018-12-01 NOTE — PROGRESS NOTES
[facility] dosing service    Initial Pharmacokinetic Consult for Vancomycin Dosing     Claire Elias is a 70year old male who is being treated for bacteremia.   Pharmacy has been asked to dose Vancomycin by [facility]    He is allergic to radiology cont the above:    1. This patient will receive a loading dose of Vancomycin  2 gm IVPB (25mg/kg, capped at 2g) x 1 dose, followed by 1.25 gm Q 24 hours  based upon, 78.5 kg weight, renal function, and pharmacokinetics.     2.  Pharmacy ordered Vancomycin trough

## 2018-12-01 NOTE — PROGRESS NOTES
Received patient from ER. alert to name,blood pressure is running low,levo rate increase to 15mcg. Ely Garcia evaluated patient and new orders placed and carried out. daughter at bed side updated plan of care.

## 2018-12-01 NOTE — CONSULTS
[facility] ID CONSULT NOTE    Jaime Roberto Patient Status:  Inpatient    1950 MRN [id number]   [facility] 2W/SW Attending Alka Raymundo MD   Hosp Day # 1 PCP Angeles Buitrago.  MD Vernell       Reason for C knees     severe    • Unspecified essential hypertension      Past Surgical History:   Procedure Laterality Date   • CONTINENT URINARY DIVERSION  2013    NEOBLADDER   • CYSTOSCOPY,INSERT URETERAL STENT      may 2018   • ELECTROCARDIOGRAM, COMPLETE      Sca sneezing, congestion, runny nose or sore throat. SKIN:  No rash or itching. CARDIOVASCULAR:  No chest pain, chest pressure or chest discomfort  RESPIRATORY:  No shortness of breath, cough or sputum.   GASTROINTESTINAL:  No anorexia, pos nausea, no vomitin is a  70year-old with a history of Sheppard's esophagus, HTN, CKD, BCC, bladder Ca (dx 2012, BCG refractory) s/p radical cystoprstatectomy/neobladder 2013 w/ileal conduit conversion d/t hydronephrosis in 7/2018 c/b EC fistula/wound- on long-term TPN.  Also immunotherapy. -  Follow fever curve, wbc, BP.  AM labs  -  Reviewed labs, micro, imaging reports, available old records at length  -  Discussed with patient, daughter, CHRISTY RN, primary    Thank you for allowing us to participate in the care of this patient

## 2018-12-01 NOTE — PROGRESS NOTES
[facility] [place] [facility] to hold off pulling PICC till tomorrow.

## 2018-12-01 NOTE — CM/SW NOTE
Pt noted as current with [facility]. [employer] is down, so updated packet was sent via fax.     [facility] Dianna 59, MSW, [address] [place]

## 2018-12-01 NOTE — PROGRESS NOTES
Pulmonary/Critical Care Follow Up Note    HPI:   Pastor Kennedy is a 70year old male with Patient presents with:  Abdomen/Flank Pain (GI/)      PCP Carol Monet.  Irene Adamson MD  Admission Attending Tanika Alaniz 15 Day #1    No pain  No sob  No olga neobladder        PHYSICAL EXAM:   Blood pressure 102/57, pulse 94, temperature 98.1 °F (36.7 °C), temperature source Temporal, resp. rate 23, height 5' 9\" (1.753 m), weight 199 lb (90.3 kg), SpO2 96 %.     Intake/Output Summary (Last 24 hours) at 12/1/201

## 2018-12-01 NOTE — CONSULTS
[facility]    PATIENT'S NAME: Terry Morelandisrael   ATTENDING PHYSICIAN: Juan Walls MD   CONSULTING PHYSICIAN: Pablito Hathaway.  Adalid Oconnell MD   PATIENT ACCOUNT#:   [id number]    LOCATION:  [address] [place], [place] [address] [address] #:   [id number]       DATE OF BIRTH:  [birth year] emergency department, he was found to be in shock after volume resuscitation with fluids and has been started on vasopressors. He has been admitted to the ICU on antibiotics. Urinalysis shows evidence of a UTI; however, final urine culture is pending.   L RADIOLOGIC/OTHER DIAGNOSTIC STUDIES:  A chest x-ray today shows mild cardiomegaly. No evidence of pneumonia.     ASSESSMENT AND PLAN:  The patient is a pleasant 50-year-old male, well known to Medical Oncology, with metastatic urothelial carcinoma of t

## 2018-12-02 NOTE — PROGRESS NOTES
Called to give handoff report to Jovan Penaloza [facility]. Unavailable at time of call will call writer back.     [address] report given to receiving RN      [address] ordered

## 2018-12-02 NOTE — PROGRESS NOTES
Pulmonary/Critical Care Follow Up Note    HPI:   Oleg Dee is a 70year old male with Patient presents with:  Abdomen/Flank Pain (GI/)      PCP Kyle Hirsch.  Dearl Ormond, MD  Admission Attending Tanika Root 15 Day #2    No pain  No sob  No olga (PROTONIX) 40 mg in Sodium Chloride 0.9 % 10 mL IV push, 40 mg, Intravenous, Daily  •  ondansetron HCl (ZOFRAN) injection 4 mg, 4 mg, Intravenous, Q6H PRN      Allergies:    Radiology Contrast *    OTHER (SEE COMMENTS)    Comment:Pt has neobladder        P supportive care   Transfuse Hb < 7       GI prophylaxis  PPI     DVT prophylaxis  SCD only avoid heparin now since platelet is low     EOL  Full code  Pt asked about prognosis  Pt seems surprised to learn that his treatment and immunotherapy If he becomes

## 2018-12-02 NOTE — PROGRESS NOTES
[facility]    Progress Note    Oleg Dee Patient Status:  Inpatient    1950 MRN [id number]   Location [facility] 4W/SW/SE Attending Kyle Li MD   Hosp Day # 2 PCP Kyle Hirsch.  MD Vernell     Assessment and Plan: 12/02/18 0000 102/64 99.1 °F (37.3 °C) Temporal 80 (!) 6 94 %   12/01/18 2300 102/65 — — 84 23 93 %   12/01/18 2200 105/64 — — 86 13 96 %   12/01/18 2100 101/62 — — 88 23 95 %   12/01/18 2000 102/70 98.1 °F (36.7 °C) Temporal 85 21 96 %   12/01/18 1900 1 12/01/2018    PT 12.7 03/27/2012    TSH 1.80 03/02/2018    PSA 0.0 07/01/2018    MG 1.8 12/02/2018    PHOS 3.1 12/02/2018       Ct Abdomen+pelvis(cpt=74176)    Result Date: 12/1/2018  CONCLUSION:  1.  Extensive bilateral perinephric fat stranding may relate Mild dominant venous distention. 2. No acute pulmonary consolidation. 3. Double-lumen Central venous catheter with the tip in the distal SVC.  No pneumothorax     Dictated by (CST): Norris Yee MD on 12/02/2018 at 9:12     Approved by (CST): Mary Cano

## 2018-12-02 NOTE — PROGRESS NOTES
Patient alert and oriented x4. Patient has all belongings with them packed and ready. Patient family at bedside.

## 2018-12-02 NOTE — SIGNIFICANT EVENT
[facility]    Progress Note    Magnus Robbins Patient Status:  Inpatient    1950 MRN [id number]   Location [facility] 4W/SW/SE Attending Suyapa Carpenter MD   Hosp Day # 2 PCP Laurita Malone.  MD Vernell       Subjective:     Res 12/02/2018    PHOS 3.1 12/02/2018       Ct Abdomen+pelvis(cpt=74176)    Result Date: 12/1/2018  CONCLUSION:  1. Extensive bilateral perinephric fat stranding may relate to acute ascending urinary tract infection.   2. Diffuse gallbladder wall thickening and Central venous catheter with the tip in the distal SVC.  No pneumothorax     Dictated by (CST): Nader Morejon MD on 12/02/2018 at 9:12     Approved by (CST): Nader Morejon MD on 12/02/2018 at 9:19          Xr Chest Ap Portable  (cpt=71045)    Re

## 2018-12-02 NOTE — PROGRESS NOTES
[facility]  Nephrology Daily Progress Note    Lindsay Ramirez  [id number]  70year old      HPI:   Lindsay Ramirez is a 70year old male. Overall feels better. Stronger and more alert.        ROS:     Constitutional:  Negative for decreased a age    Labs:  Lab Results   Component Value Date    WBC 3.2 12/02/2018    HGB 7.8 12/02/2018    HCT 24.6 12/02/2018    PLT 46 12/02/2018    CREATSERUM 2.05 12/02/2018    BUN 40 12/02/2018     12/02/2018    K 3.9 12/02/2018     12/02/2018    CO2 increased in number, size or, and conspicuity. 6. Postoperative changes of radical cystoprostatectomy are demonstrated. There is a right lower quadrant ileal conduit. 7. Punctate nonobstructing left renal calculus.  There is asymmetric right renal cortica (CST): Мария Garcia MD on 11/30/2018 at 15:53              Medications:    Current Facility-Administered Medications:   •  iron sucrose (VENOFER) IV Push 200 mg, 200 mg, Intravenous, Daily  •  Vancomycin HCl (VANCOCIN) 1,250 mg in sodium chlorid Pathological fracture in other disease, right femur, initial encounter for fracture [facility])     History of bladder cancer     Bone metastases ([facility] [address]     Hydronephrosis     Abdominal wound dehiscence     Abdominal wound dehiscence, initial encounter     Brain

## 2018-12-02 NOTE — PROGRESS NOTES
Spoke to Dr. Tanya Key, and Tunde Graves [place] with ID. Patient ok to transfer to Oncology Floor today. Plan at this time is to have ID remove Tunnel cath today or tomorrow.

## 2018-12-02 NOTE — PROGRESS NOTES
12/02/18 0722   Clinical Encounter Type   Visited With Patient   Routine Visit Follow-up   Crisis Visit Critical care   Referral From ;Nurse   Referral To    Holiness Encounters   Holiness Needs Prayer;Pastoral care brochure;Holiness

## 2018-12-02 NOTE — PROGRESS NOTES
[facility] ID PROGRESS NOTE    Radha Gee Patient Status:  Inpatient    1950 MRN [id number]   [facility] 2W/SW Attending Cecilia Retana MD   Hosp Day # 2 PCP Christ Johnson. MD Vernell     Subjective:  Tmax 99.1 this AM. Awake and alert.  C wound dehiscence, initial encounter     Brain metastasis (HCC)     Bone metastasis ([address]     Enterocutaneous fistula     Malignant neoplasm of urinary bladder (HCC)     Nausea & vomiting     Acute renal failure (ARF) ([address]     Chemotherapy follow-up examin requiring pressors, with GNR/S.aureus bacteremia- likely  source, ?  Concomitant line infection, no e/o respiratory/SSTI    -  CT A/P extensive B/L perinephric fat stranding and hydronephrosis/ureter, punctate nonobstructing L renal calculus, diffuse GB w

## 2018-12-02 NOTE — PLAN OF CARE
Problem: Patient/Family Goals  Goal: Patient/Family Long Term Goal  Patient's Long Term Goal:  To discharge home.     Interventions:  - Medications as ordered  - PT/OT  - See additional Care Plan goals for specific interventions  Outcome: [address] Spirometry  - Assess the need for suctioning and perform as needed  - Assess and instruct to report SOB or any respiratory difficulty  - Respiratory Therapy support as indicated  - Manage/alleviate anxiety  - Monitor for signs/symptoms of CO2 retention  Dania Villalba surrounding tissue  - Implement wound care per orders  - Initiate isolation precautions as appropriate  - Initiate Pressure Ulcer prevention bundle as indicated  Outcome: Progressing      Problem: Patient Centered Care  Goal: Patient preferences are identi

## 2018-12-02 NOTE — PROGRESS NOTES
[facility]    Progress Note    Liliya Mojica Patient Status:  Inpatient    1950 MRN [id number]   Location [facility] 4W/SW/SE Attending Dilip De Luna MD   Hosp Day # 2 PCP Geneva Matt MD        Subjective:   Ishan Schooling Iron deficiency anemia:  Receiving venofer with rigors 1 hr post administration. Demerol 12.5mg and solumedrol 12.5mg both IV given. May have further venofer if has to complete course but will need slower infusion and close monitoring.     Further palliat CONCLUSION:  1. Extensive bilateral perinephric fat stranding may relate to acute ascending urinary tract infection. 2. Diffuse gallbladder wall thickening and pericholecystic inflammation. Correlation for potential acute cholecystitis is suggested.   3. U CONCLUSION:  1. Borderline cardiomegaly. . Mild dominant venous distention. 2. No acute pulmonary consolidation. 3. Double-lumen Central venous catheter with the tip in the distal SVC.  No pneumothorax     Dictated by (CST): Jenni Vera MD on 12/02/2

## 2018-12-02 NOTE — PROGRESS NOTES
[facility]    CDI Prediction Tool Protocol (Vancomycin Prophylaxis Deferred)      This patient is currently at high risk for developing CDI due to his/her score being >/= 13 points.   The current score is: 13    Score Breakdown:  High risk antibi

## 2018-12-03 NOTE — BRIEF PROCEDURE NOTE
[facility]OSP - [facility]  Procedure Note    Hallam Barrier Patient Status:  Inpatient    1950 MRN [id number]   Location [facility] Attending Jeevan Mcdaniel MD   Hosp Day # 3 PCP Ivy Davis.  MD Vernell     Proce

## 2018-12-03 NOTE — PROGRESS NOTES
[facility] - [facility]  Nephrology Daily Progress Note    Keren Burkitt  [id number]  70year old      HPI:   Keren Burkitt is a 70year old male. Had rigors soon after Venofer given yesterday. Resolved with Benadryl and solumedrol.  Now feeling well appropriate for age reflexes and motor skills appropriate for age    Labs:  Lab Results   Component Value Date    WBC 3.9 12/03/2018    HGB 8.7 12/03/2018    HCT 26.8 12/03/2018    PLT 46 12/03/2018    CREATSERUM 2.01 12/03/2018    BUN 39 12/03/2018    NA 6. Postoperative changes of radical cystoprostatectomy are demonstrated. There is a right lower quadrant ileal conduit. 7. Punctate nonobstructing left renal calculus.  There is asymmetric right renal cortical atrophy multifocal left renal cortical scarrin Approved by (CST): Ilya Garcia MD on 12/01/2018 at 7:38          Xr Chest Ap Portable  (cpt=71045)    Result Date: 11/30/2018  CONCLUSION:  1. Mild cardiomegaly. Metastatic lesion involving the right 3rd rib.      Dictated by (CST): Jose -647 ml          ASSESSMENT/PLAN:   Assessment   Patient Active Problem List:     Peritoneal abscess ([facility] [address]     Anemia     Sheppard's esophagus     Malignant neoplasm of bladder (HCC)     Diarrhea     Hypertension, benign     Malignant neoplasm of prostate (

## 2018-12-03 NOTE — PROGRESS NOTES
[facility]OS[facility] - [facility]    Progress Note    Tonya Zamarripa Patient Status:  Inpatient    1950 MRN [id number]   Location [facility] Attending Shira Orantes MD   Hosp Day # 3 PCP Gabriella Andersen.  MD Tonya Carr kidney injury  Better, UO somewhat better  Renal following  -on bicarb drip  -monitor     thrombocytopenia / sepsis / and bone mets   Anemia  -likely allergic reaction to venofer yesterday 12/2    H/o Sheppard;s esophagus   GI prophylaxis  PPI     DVT proph

## 2018-12-03 NOTE — PROGRESS NOTES
[facility] Dosing Service  Antibiotic Dosing    Conner Yusuf is a 70year old male for whom pharmacy is dosing zosyn for treatment of  bacteremia. Allergies: is allergic to venofer [iron sucrose] and radiology contrast iodinated dyes.     Vitals: BP

## 2018-12-03 NOTE — TELEPHONE ENCOUNTER
To Dr. Wilder Conteh - araseli - pt update per daughter Cy Alfonso - Pt \"was in septic shock\". Pt was in ICU, while moving to pt room pt had chills due to iron infusion but recovered and is now in regular room.   Hoping they will know specific bacteria today so treatment

## 2018-12-03 NOTE — PROGRESS NOTES
[facility]    Hematology/Oncology   Progress Note    Radha Gee Patient Status:  Inpatient    1950 MRN [id number]   Location [facility] Attending Jayden Lake MD   Hosp Day # 3 PCP Christ Johnson into the right obturator internus musculature. 5. Additional numerous osseous metastatic lesions have increased in number, size or, and conspicuity. 6. Postoperative changes of radical cystoprostatectomy are demonstrated.  There is a right lower quadrant 12/1/2018  CONCLUSION:  1. No significant change since previous study. No acute findings.      Dictated by (CST): Christopher Perez MD on 12/01/2018 at 7:37     Approved by (CST): Christopher Perez MD on 12/01/2018 at 7:38              [facility]

## 2018-12-03 NOTE — CM/SW NOTE
SW met w/ pt to discuss eventual discharge needs. SW discussed possible home IV abx, in which pt stated he is comfortable doing the home teach & train.  Pt stated that he is current w/ [facility] and requested referral to [address] [place] [address] Dr Sheth for Infusions, due to carlos

## 2018-12-03 NOTE — PROGRESS NOTES
[facility] ID PROGRESS NOTE    Binta Salinas Patient Status:  Inpatient    1950 MRN [id number]   [facility] 2W/SW Attending Loraine Rudolph MD   Hosp Day # 3 PCP Sawyer Phelps. MD Vernell     Subjective:  Awake, afebrile.     Objective:    BP initial encounter     Brain metastasis ([address]     Bone metastasis ([address]     Enterocutaneous fistula     Malignant neoplasm of urinary bladder (HCC)     Nausea & vomiting     Acute renal failure (ARF) ([address]     Chemotherapy follow-up examination     Hypovole fluconazole. ID consulted.     # Septic shock requiring pressors, with MSSA/K. oxytoca bacteremia- likely  source, ?  Concomitant line infection, no e/o respiratory/SSTI    -  CT A/P extensive B/L perinephric fat stranding and hydronephrosis/ureter, punct

## 2018-12-03 NOTE — WOUND PROGRESS NOTE
Wound Care Services  Consulted to change the vac dressing to the pt's mid abdominal wound/ileostomy site, spoke with the nurse this am, she was unsure if the pt. was going to the OR.  I called and spoke with Dr Lizy Lantigua and obtained orders to remove and disconti

## 2018-12-03 NOTE — PLAN OF CARE
RRT    *See RRT Documentation Record*    Reason the RRT was called: 3823  Assessment of patient leading up to RRT: severe rigors, rapid heart rate, no fever, patient a&o  Interventions/Testing: iv tylenol, LR, demerol, solu-medrol,   Patient Outcome/Dispos

## 2018-12-03 NOTE — PROGRESS NOTES
Handoff report given to Marv Fofana RN. Procedure access site is dry and intact, with no signs and symptoms of active bleeding or hematoma. Patient VS stable. MD spoke with patient post procedure.

## 2018-12-03 NOTE — PLAN OF CARE
Problem: Patient/Family Goals  Goal: Patient/Family Long Term Goal  Patient's Long Term Goal:Go home    Interventions:  - Wound Vac  -IV abx  -IV fluids  -Pain management   - See additional Care Plan goals for specific interventions  Outcome: Progressing breathe, Incentive Spirometry  - Assess the need for suctioning and perform as needed  - Assess and instruct to report SOB or any respiratory difficulty  - Respiratory Therapy support as indicated  - Manage/alleviate anxiety  - Monitor for signs/symptoms o drain sites and surrounding tissue  - Implement wound care per orders  - Initiate isolation precautions as appropriate  - Initiate Pressure Ulcer prevention bundle as indicated  Outcome: Progressing      Problem: Patient Centered Care  Goal: Patient prefer

## 2018-12-03 NOTE — PROGRESS NOTES
[facility]    Progress Note    Aminata Ramirez Patient Status:  Inpatient    1950 MRN [id number]   Location [facility] 4W/SW/SE Attending Robby Arauz MD   Hosp Day # 3 PCP Loida Kong.  MD Vernell     Assessment and Plan: Total Output 4961 1728 --       Net I/O     7841 -347 --          Exam:   /62   Pulse 87   Temp 98.2 °F (36.8 °C) (Oral)   Resp 18   Ht 175.3 cm (5' 9\")   Wt 199 lb (90.3 kg)   SpO2 99%   BMI 29.39 kg/m²   Gen:  NAD  Abd:  Soft, NT, ND, ileal condui

## 2018-12-03 NOTE — PROGRESS NOTES
[facility]    Progress Note    Eleazarkash Madelin Patient Status:  Inpatient    1950 MRN [id number]   Location [facility] 4W/SW/SE Attending Leni Valerio MD   Hosp Day # 3 PCP Ariana Matt MD        Subjective: 12/03/2018    HCT 26.8 (L) 12/03/2018    PLT 46 (L) 12/03/2018    CREATSERUM 2.01 (H) 12/03/2018    BUN 39 (H) 12/03/2018     12/03/2018    K 4.3 12/03/2018     (H) 12/03/2018    CO2 18 (L) 12/03/2018     (H) 12/03/2018    CA 7.3 (L) 12/

## 2018-12-04 NOTE — OCCUPATIONAL THERAPY NOTE
OCCUPATIONAL THERAPY EVALUATION - INPATIENT     Room Number: 462/462-A  Evaluation Date: 12/4/2018  Type of Evaluation: Initial  Presenting Problem: metastatic bladder cancer, wound vac removal, ileostomy pouch.     Physician Order: IP Consult to Zaira Dodge'PRADIP'' Us hematuria    Acalculous cholecystitis    Counseling regarding end of life decision making    Bacteremia      Past Medical History  Past Medical History:   Diagnosis Date   • Abscess 2013    s/p neobladder   • Anemia, iron deficiency 2008   • Sheppard's esop RANGE OF MOTION   Upper extremity ROM is within functional limits     STRENGTH ASSESSMENT  Upper extremity strength is within functional limits     COORDINATION  Gross Motor: WFL   Fine Motor: WFL     ACTIVITIES OF DAILY LIVING ASSESSMENT  AM-PAC ‘6-

## 2018-12-04 NOTE — PROGRESS NOTES
[facility] - [facility]    Hematology/Oncology   Progress Note    Williams Sheehan Patient Status:  Inpatient    1950 MRN [id number]   Location [facility] Attending Kiarra Wu MD   Hosp Day # 4 LISETTE Bee Desiree Mukherjee MD on 12/02/2018 at 9:19          Ir Central Venous Access    Result Date: 12/3/2018  CONCLUSION: Surgical removal of tunneled Cyr catheter, tip sent for cultures and sensitivities.     Dictated by (CST): Allen De La Garza MD on 12/03/

## 2018-12-04 NOTE — DIETARY NOTE
ADULT NUTRITION INITIAL ASSESSMENT    Pt is at moderate nutrition risk. Pt does not meet malnutrition criteria.       RECOMMENDATIONS TO MD:  See Nutrition Intervention     NUTRITION DIAGNOSIS/PROBLEM:  Altered GI function related to alteration in GI tract of biopsy of bladder 2012   • Ileostomy in place [facility]) 2013   • Osteoarthritis of both knees     severe    • Unspecified essential hypertension      ANTHROPOMETRICS:  HT: 175.3 cm (5' 9\")  WT: 92.5 kg (204 lb)  Requested repeat weight due to a 17 lb(8.3%) per visual exam.  - Fluid Accumulation: generalized edema per RN documentation and visual exam  - Skin Integrity: surgical wounds, at risk, breakdown and RN documentation reviewed.  Documented mid abdomin dehisced wound with fistula--wound vac removed 12/3/

## 2018-12-04 NOTE — CONSULTS
[facility] NOTE  Cardiology Consultation    Lindsay Ramirez Patient Status:  Inpatient    1950 MRN [id number]   Location [facility] 4W/SW/SE Attending Iza Heredia MD   Hosp Day # 4 PCP Kishan Fisher.  MD Vernell     Reason f metastasis ([facility] [address]     Enterocutaneous fistula     Malignant neoplasm of urinary bladder (HCC)     Nausea & vomiting     Acute renal failure (ARF) (HCC)     Chemotherapy follow-up examination     Hypovolemia     Nausea vomiting and diarrhea     Cancer relate COMMENTS)    Comment:Pt has neobladder    Home Medications:    Medications Prior to Admission:  fentaNYL 50 MCG/HR Transdermal Patch 72 Hr Place 1 patch onto the skin every third day. For cancer pain. Please fill.  Disp: 10 patch Rfl: 0 11/30/2018 at [facility] [place] headaches  ENT no sinusitis   Chest no cough no hemoptysis  GI no hematemesis no melena   no dysuria hematuria   hematologic no bleeding  Neurologic no TIA-like symptoms  Skin no rashes       Intake/Output:     Intake/Output Summary (Last 24 hours) at 12 12/04/2018    HCT 28.9 12/04/2018    PLT 44 12/04/2018    CREATSERUM 2.14 12/04/2018    BUN 46 12/04/2018     12/04/2018    K 4.3 12/04/2018     12/04/2018    CO2 21 12/04/2018     12/04/2018    CA 7.7 12/04/2018    ALB 1.8 12/04/2018 of your patient.     Valente Bravo,   12/4/2018  12:41 PM

## 2018-12-04 NOTE — PROGRESS NOTES
[facility]    [facility] Note    Paulo Ames Patient Status:  Inpatient    1950 MRN [id number]   Location [facility] 4W/SW/SE Attending Fidel Au MD   Hosp Day # 4 PCP Xiang Patino.  MD Vernlel     Assessment and Plan: NAD  Abd:  Soft, NT, ND, ileal conduit in place, lower midline ileostomy with four lumens seen.     Results:     Lab Results   Component Value Date    WBC 4.3 12/04/2018    HGB 9.3 (L) 12/04/2018    HCT 28.9 (L) 12/04/2018    PLT 44 (L) 12/04/2018    CREATS

## 2018-12-04 NOTE — PHYSICAL THERAPY NOTE
PHYSICAL THERAPY EVALUATION - INPATIENT     Room Number: 462/462-A  Evaluation Date: 12/4/2018  Type of Evaluation: Initial   Physician Order: PT Eval and Treat    Presenting Problem: sepsis due to UTI  Reason for Therapy: Mobility Dysfunction and Dischar prefer just to go home with no follow up. Get the therapy while I'm here. \"    DISCHARGE RECOMMENDATIONS  PT Discharge Recommendations: Home with home health PT(PRN assist and supervision from family recommended)    PLAN  PT Treatment Plan: Bed mobility; Kenia Lui (walker or cane.)    Prior Level of [place]: Ind in ADL's- sister lives across the street and assists with driving. Patient reporting, \"depending on the day and how I'm feeling, I will either grab a cane or a walker or nothing! \"    SUBJECTIVE  \"I w Standardized Score (AM-PAC Scale): 45.44   CMS Modifier (G-Code): CK    FUNCTIONAL ABILITY STATUS  Gait Assessment   Gait Assistance: Contact guard assist  Distance (ft): 300ft  Assistive Device: Rolling walker  Pattern: Within Functional Limits  Stoop/C

## 2018-12-04 NOTE — PROGRESS NOTES
[facility] ID PROGRESS NOTE    Lindsay Ramirez Patient Status:  Inpatient    1950 MRN [id number]   [facility] 2W/SW Attending Lanny Parham MD   Hosp Day # 4 PCP Kishan Fisher. MD Vernell     Subjective:  Awake, afebrile.  Cyr line was pul bladder cancer     Bone metastases (HCC)     Hydronephrosis     Abdominal wound dehiscence     Abdominal wound dehiscence, initial encounter     Brain metastasis (HCC)     Bone metastasis ([address]     Enterocutaneous fistula     Malignant neoplasm of urinary and GNR. Hypotensive as well requiring pressors, now weaned to 4mcg. Ucx pending. CT A/P ordered. Given vanc x1, continued on zosyn and fluconazole. ID consulted.     # Septic shock requiring pressors, with MSSA/K. oxytoca bacteremia- likely  source, ?  C

## 2018-12-05 NOTE — PROGRESS NOTES
[facility] ID PROGRESS NOTE    Fiordaliza Oconnell Patient Status:  Inpatient    1950 MRN [id number]   [facility] 2W/SW Attending Kwaku Condon MD   Hosp Day # 5 PCP Dontrell Rodriguez. MD Vernell     Subjective:  Awake, afebrile. TATE today.  Did eat y wound dehiscence     Abdominal wound dehiscence, initial encounter     Brain metastasis (HCC)     Bone metastasis ([address]     Enterocutaneous fistula     Malignant neoplasm of urinary bladder (HCC)     Nausea & vomiting     Acute renal failure (ARF) ([address] now weaned to [address]. Ucx pending. CT A/P ordered. Given vanc x1, continued on zosyn and fluconazole. ID consulted.     # Septic shock requiring pressors, with MSSA/K. oxytoca bacteremia- likely  source, ?  Concomitant line infection, no e/o respiratory/SST

## 2018-12-05 NOTE — PROGRESS NOTES
Michelet with bubble study performed. Pt tolerated well. Report called to [facility]. Awaiting transport.

## 2018-12-05 NOTE — PROGRESS NOTES
[facility] HOSP - [facility]    Progress Note    Carola Summers Patient Status:  Inpatient    1950 MRN [id number]   Location [facility] Attending Bronwyn Fernandez MD   Hosp Day # 4 PCP Sarah Frederick.  MD Danny Rodriguez wound care/now off wound vac     Acute on chronic kidney injury  Better, UO somewhat better  Renal following  -on bicarb drip  -monitor     thrombocytopenia / sepsis / and bone mets   Anemia  -likely allergic reaction to venofer on 12/2    H/o Jovi ramos

## 2018-12-05 NOTE — PROGRESS NOTES
[facility] HOSP - [facility]    Progress Note    Oleg Dee Patient Status:  Inpatient    1950 MRN [id number]   Location [facility] Attending Kyle Li MD   Hosp Day # 5 PCP Kyle Hirsch.  Dearl Ormond, MD       [facility] bicarb drip  -monitor     thrombocytopenia / sepsis / and bone mets   Anemia  -likely allergic reaction to venofer on 12/2    H/o Sheppard;s esophagus   GI prophylaxis  PPI     DVT prophylaxis  SCD only avoid heparin now since platelet is low     CODE STATU

## 2018-12-05 NOTE — CM/SW NOTE
Addend 339p:     ASHISH received scripts for zosyn q8 from 12/5/18-12/17/18 and ancef, q12 12/18/18-12/31/18. ASHISH sent scripts to [facility] via [address].  ASHISH spoke w/ Bakari Singh from [facility] and requested they schedule an RN for Saturday morning.     --------------------

## 2018-12-05 NOTE — WOUND PROGRESS NOTE
Wound Care Services  Attempting to follow up on the pt.'s ileal conduit and mid abdominal ileostomy appliances, the pt. is at a TATE test. Spoke with the nurse and the appliances are intact without leaking, we will continue to follow up on the pt.

## 2018-12-05 NOTE — PROGRESS NOTES
[facility] HOSP - [facility]    Hematology/Oncology   Progress Note    [facility] Patient Status:  Inpatient    1950 MRN [id number]   Location [facility] Attending Tai Esposito MD   Hosp Day # 5 PCP Denise Amado carcinoma of the urinary bladder as outlined above, PDL1 negative, with widespread bone metastasis. He is status post 1 cycle of carboplatin and gemcitabine given on 11/02/2018.      He is admitted to the hospital with septic shock bacteremia.  MSSA K oxyt

## 2018-12-05 NOTE — PROGRESS NOTES
[facility]    Progress Note    Cheko Oliver Patient Status:  Inpatient    1950 MRN [id number]   Location [facility] 4W/SW/SE Attending Holland Romero MD   Hosp Day # 4 PCP Marcus Becerra.  MD Vernell       Subjective:   Chest pulses normal  Skin/Hair: no unusual rashes present, no abnormal bruising noted  Back/Spine: no abnormalities noted  Musculoskeletal: full ROM all extremities good strength  no deformities  Extremities: no edema, cyanosis  Neurological:  Grossly normal 12/4/2018  Christopher Hernandez MD

## 2018-12-05 NOTE — PROCEDURES
Pre-op: Bacteremia   Postop: Same  Procedure: TATE    Findings: No vegetation    Sedation: Versed and Fentanyl, monitored for 15 minutes  EBL: 0 cc  Specimen: none  Complication: none  Condition: stable

## 2018-12-06 NOTE — PROGRESS NOTES
[facility]    Progress Note    Tammi Garg Patient Status:  Inpatient    1950 MRN [id number]   Location [facility] Attending Dimitris Shaw MD   Hosp Day # 6 PCP Sun Floyd.  MD Kavon Plaza bone mets   Anemia  -likely allergic reaction to venofer on 12/2    H/o Sheppard;s esophagus   GI prophylaxis  PPI     DVT prophylaxis  SCD only avoid heparin now since platelet is low     CODE STATUS full code     Dispo: home with outpt IV abx tomorrow

## 2018-12-06 NOTE — PHYSICAL THERAPY NOTE
PHYSICAL THERAPY TREATMENT NOTE - INPATIENT     Room Number: 462/462-A       Presenting Problem: sepsis due to UTI    Problem List  Principal Problem:    Sepsis due to urinary tract infection [facility])  Active Problems:    Bladder cancer metastasized to bone ( conservation techniques that can be applied to home environment. Future sessions to focus on stair negotiation goal. Recommending HHPT- pt reporting, \"I think I would prefer just to go home with no follow up. Get the therapy while I'm here. \"        Jordin Fields x 2  Assistive Device: Rolling walker  Pattern: Shuffle  Stoop/Curb Assistance: Not tested  Comment : short sitting rest break    Additional information:     THERAPEUTIC EXERCISES  Lower Extremity Alternating marching  Ankle pumps  Glut sets  Knee extensio

## 2018-12-06 NOTE — PROGRESS NOTES
[facility] ID PROGRESS NOTE    Claire Elias Patient Status:  Inpatient    1950 MRN [id number]   [facility] 2W/SW Attending Yari Benitez MD   [facility] Day # 6 PCP Nicki Rich. MD Vernell     Subjective:  Awake, afebrile.  Having some abdomina metastases ([address]     Hydronephrosis     Abdominal wound dehiscence     Abdominal wound dehiscence, initial encounter     Brain metastasis (HCC)     Bone metastasis ([address]     Enterocutaneous fistula     Malignant neoplasm of urinary bladder (HCC)     Nausea clusters and GNR. Hypotensive as well requiring pressors, now weaned to 4mcg. Ucx pending. CT A/P ordered. Given vanc x1, continued on zosyn and fluconazole.  ID consulted.     # Septic shock requiring pressors, with MSSA/K. oxytoca bacteremia- likely  so

## 2018-12-06 NOTE — PROGRESS NOTES
[facility] HOSP - [facility]    Hematology/Oncology   Progress Note    Dellkatherineisrael Brent Patient Status:  Inpatient    1950 MRN [id number]   Location [facility] Attending Meryle Guarneri, MD   Hosp Day # 6 PCP Carol Monet Charles Menchaca MD

## 2018-12-06 NOTE — PROGRESS NOTES
[facility]    Progress Note    Kimani Huizar Patient Status:  Inpatient    1950 MRN [id number]   Location [facility] 4W/SW/SE Attending Marquis Luis MD   Hosp Day # 5 PCP Stewart Richey.  MD Vernell       Subjective:   Arthuro Artist no abnormal bruising noted  Back/Spine: no abnormalities noted  Musculoskeletal: full ROM all extremities good strength  no deformities  Extremities: no edema, cyanosis  Neurological:  Grossly normal    Results:     Laboratory Data:  Lab Results   Componen

## 2018-12-06 NOTE — PROGRESS NOTES
[facility]    Progress Note    Liliya Mojica Patient Status:  Inpatient    1950 MRN [id number]   Location [facility] 4W/SW/SE Attending Meli Bhatia MD   [facility] Day # 6 PCP Geneva Matt MD     Assessment and Plan: 97.5 °F (36.4 °C) (Oral)   Resp 18   Ht 175.3 cm (5' 9\")   Wt 204 lb 1.6 oz (92.6 kg)   SpO2 98%   BMI 30.14 kg/m²   Gen:  NAD  Abd:  Soft, NT, ND, ileal conduit in place, ostomy with liquid output.     Results:     Lab Results   Component Value Date    WB

## 2018-12-06 NOTE — WOUND PROGRESS NOTE
Wound Care Services  Attempting to follow up on the pt.'s ileal conduit and mid abdominal ileostomy appliances, the pt. is asleep in bed, he appears comfortable.  Spoke with the nurse and the appliances are intact without leaking, we will continue to follow

## 2018-12-06 NOTE — PLAN OF CARE
Problem: Patient/Family Goals  Goal: Patient/Family Short Term Goal  Patient's Short Term Goal: Pain managment    Interventions:   - IV abx  -Use of pain scale  -PRN pain medicaiton  - See additional Care Plan goals for specific interventions   Outcome: Pr appropriate  - Advance diet as tolerated, if ordered  - Obtain nutritional consult as needed  - Evaluate fluid balance  Outcome: Progressing      Problem: Patient Centered Care  Goal: Patient preferences are identified and integrated in the patient's plan

## 2018-12-07 NOTE — PROGRESS NOTES
[facility] HOSP - [facility]    Hematology/Oncology   Progress Note    Keren Burkitt Patient Status:  Inpatient    1950 MRN [id number]   Location [facility] Attending Mahesh Jeter MD   Hosp Day # 7 LISETTE Mendez --thrombocytopenia anemia noted. Can transfuse plts if bleeding or invasive procedure. He is iron def. Reacted to IV venofer. --hopefully can work towards discharge soon so that cancer directed therapy can resume.      Dr. Bennett Garcia here this weekend

## 2018-12-07 NOTE — PROGRESS NOTES
[facility] [place] [facility] - [facility]    Progress Note    Radha Gee Patient Status:  Inpatient    1950 MRN [id number]   Location [facility] 4W/SW/SE Attending Ayleen Jeong MD   [facility] Day # 7 PCP Christ Matt MD     Assessment and Plan:      A 2.16 (H) 12/07/2018    BUN 33 (H) 12/07/2018     (L) 12/07/2018    K 3.5 12/07/2018    K 3.5 12/07/2018     12/07/2018    CO2 20 (L) 12/07/2018     (H) 12/07/2018    CA 7.5 (L) 12/07/2018    ALB 1.8 (L) 12/07/2018    ALKPHO 232 (H) 12/07

## 2018-12-07 NOTE — PROGRESS NOTES
[facility] ID PROGRESS NOTE    Magnus Robbins Patient Status:  Inpatient    1950 MRN [id number]   [facility] 2W/SW Attending Delores Domínguez MD   [facility] Day # 7 PCP Laurita Malone. MD Vernell     Subjective:  Awake, afebrile.  Reports increased ab fracture in other disease, right femur, initial encounter for fracture [facility])     History of bladder cancer     Bone metastases ([facility]     Hydronephrosis     Abdominal wound dehiscence     Abdominal wound dehiscence, initial encounter     Brain metastasis (H on CKD. LA 6.1, improving. UA with 258 wbc. CXR without acute path, chronic rib met. Blood cx growing GPC in clusters and GNR. Hypotensive as well requiring pressors, now weaned to 4mcg. Ucx pending. CT A/P ordered.  Given vanc x1, continued on zosyn and fl

## 2018-12-07 NOTE — CM/SW NOTE
Pt informed SW that he is agreeable w/ going to rehab at [facility]. ASHISH confirmed w/ Bj from [facility] they are able to accept and provide pt's medical needs. Bj stated they are able to provided transport to [facility] if needed.  ASHISH in

## 2018-12-07 NOTE — WOUND PROGRESS NOTE
Wound Care Services  Follow up on the pt., he is up in the chair, no c/o pain, he states he is feeling weak, the pt.  is eating again, the ileal conduit appliance is intact, the mid abdomen ileostomy pouch is intact, but will change it before it leaks, the

## 2018-12-07 NOTE — CM/SW NOTE
HANNAH received for [place] [id number] services; SW sent order to [facility] and spoke w/ Wendy Gitelman from [facility] [phone] to inform her that discharge is uncertain at this time and will call day of discharge. SW met w/ pt to discuss eventual discharge plans.  SW informed pt of

## 2018-12-07 NOTE — PROGRESS NOTES
[facility]  Hospitalist Progress Note     Jesuson Roderick Patient Status:  Inpatient    [birth year]  70year old CSN [id number]   Location 462/462-A Attending Hillary Hopper MD   Hosp Day # 7 PCP Kenny Matt MD     ASSESSMENT/PLAN    Se ileal conduit bag intact with urine, enterocutaneous fistula bag intact with dark brown liquidy material  Neuro: Normal reflexes, CN. Sensory/motor exams grossly normal deficit. MS: No joint effusions. No peripheral edema. Skin: Skin is warm and dry.  Courtney Amador Saline Flush, acetaminophen, morphINE sulfate **OR** morphINE sulfate **OR** morphINE sulfate    >35min spent, >50% spent counseling and coordinating care in the form of educating pt/family and d/w consultants and staff.   Discussed with general surgery, ID

## 2018-12-08 NOTE — PROGRESS NOTES
[facility]    Progress Note    Lindsay Ramirez Patient Status:  Inpatient    1950 MRN [id number]   Location [facility] 4W/SW/SE Attending Kwadwo Kc MD   [facility] Day # 7 PCP Kishan Fisher.  MD Vernell       Subjective:   Keyur Sushilas abnormal bruising noted  Back/Spine: no abnormalities noted  Musculoskeletal: full ROM all extremities good strength  no deformities  Extremities: no edema, cyanosis  Neurological:  Grossly normal    Results:     Laboratory Data:  Lab Results   Component V

## 2018-12-08 NOTE — PHYSICAL THERAPY NOTE
PHYSICAL THERAPY TREATMENT NOTE - INPATIENT     Room Number: 462/462-A       Presenting Problem: sepsis due to UTI    Problem List  Principal Problem:    Sepsis due to urinary tract infection [facility])  Active Problems:    Bladder cancer metastasized to bone ( Drain(s); Colostomy    WEIGHT BEARING RESTRICTION  Weight Bearing Restriction: None                PAIN ASSESSMENT   Ratin  Location: abdomen  Management Techniques: Breathing techniques;Relaxation;Repositioning    BALANCE Patient's self-stated goal is: Return home   Goal #1 Patient is able to demonstrate supine - sit EOB @ level: independent      Goal #1   Current Status Modified independent - use of bedrail   Goal #2 Patient is able to demonstrate transfers Sit to/from [facility]

## 2018-12-08 NOTE — PROGRESS NOTES
[place] [facility] H[facility] - [facility]    Progress Note    Claire Elias Patient Status:  Inpatient    1950 MRN [id number]   Location [facility] 4W/SW/SE Attending Jimmy Jacobs MD   [facility] Day # 8 PCP Nicki Rich.  MD Vernell     Assessment and Plan:     Co 12/07/2018     (H) 12/07/2018    PTT 28.1 03/27/2012    INR 1.8 (H) 12/01/2018    PT 12.7 03/27/2012    TSH 1.80 03/02/2018    PSA 0.0 07/01/2018    MG 2.0 12/08/2018    PHOS 2.8 12/07/2018    B12 794 12/08/2018                     Yesica Rosa MD

## 2018-12-08 NOTE — PROGRESS NOTES
[facility]  Hospitalist Progress Note     Tammi Garg Patient Status:  Inpatient    [birth year]  70year old [facility] [id number]   Location 462/462-A Attending Catarina Ibarra MD   Hosp Day # 8 PCP Sun Floyd.  MD Vernell     ASSESSMENT/PLAN    Se enterocutaneous fistula bag intact with dark brown liquidy material  Neuro: Normal reflexes, CN. Sensory/motor exams grossly normal deficit. MS: No joint effusions. No peripheral edema. Skin: Skin is warm and dry. No rashes, erythema, diaphoresis.    Ps piperacillin-tazobactam  3.375 g Intravenous Q8H     ondansetron HCl, Normal Saline Flush, Normal Saline Flush, Normal Saline Flush, acetaminophen, morphINE sulfate **OR** morphINE sulfate **OR** morphINE sulfate    >35min spent, >50% spent counseling and

## 2018-12-08 NOTE — PROGRESS NOTES
[address] [facility]    CDI Prediction Tool Protocol (Vancomycin Initiated)    OVP (oral vancomycin prophylaxis) 125 mg PO Daily is being started in this patient based on a score of 16.       Score Breakdown:  High risk antibiotic use (5 points)  Hospital

## 2018-12-08 NOTE — CM/SW NOTE
SW spoke with the patient per pt's request. He would like referral to be sent to [facility].  Referral sent, SW will continue to follow up.      12.8.19 11:08 am  Referral sent to [facility] as well, per pt's request.      Alexey Crespo LMSW., x

## 2018-12-09 NOTE — PROGRESS NOTES
[place] [facility] HOSP - [facility]  Hospitalist Progress Note     Carola Summers Patient Status:  Inpatient      70year old CSN [id number]   Location 462/462-A Attending Ji Sexton MD   [facility] Day # 9 PCP Sarah Frederick.  MD Vernell     ASSESSMENT/PLAN    Se distress, wheezes, rales, rhonchi. Abd: Soft, ileal conduit bag intact with urine, enterocutaneous fistula bag intact with dark brown liquidy material, some solid material  Neuro: Normal reflexes, CN. Sensory/motor exams grossly normal deficit.    MS: No j Pantoprazole Sodium  40 mg Oral QAM AC   • psyllium  1 packet Oral TID   • piperacillin-tazobactam  3.375 g Intravenous Q8H     ondansetron HCl, Normal Saline Flush, Normal Saline Flush, acetaminophen, morphINE sulfate **OR** morphINE sulfate **OR** morphI

## 2018-12-09 NOTE — PLAN OF CARE
Problem: RESPIRATORY - ADULT  Goal: Achieves optimal ventilation and oxygenation  INTERVENTIONS:  - Assess for changes in respiratory status  - Assess for changes in mentation and behavior  - Position to facilitate oxygenation and minimize respiratory effo nutritional intake and initiate nutrition consult as needed  - Instruct patient on self management of diabetes  Outcome: Progressing      Problem: SKIN/TISSUE INTEGRITY - ADULT  Goal: Incision(s), wounds(s) or drain site(s) healing without S/S of infection

## 2018-12-10 NOTE — PHYSICAL THERAPY NOTE
PHYSICAL THERAPY TREATMENT NOTE - INPATIENT     Room Number: 462/462-A       Presenting Problem: sepsis due to UTI    Problem List  Principal Problem:    Sepsis due to urinary tract infection [facility])  Active Problems:    Bladder cancer metastasized to bone ( Degree of Impairment: 11.2% has been calculated based on documentation in the [facility] '6 clicks' Inpatient Basic Mobility Short Form. Research supports that patients with this level of impairment may benefit from home.  However, pt is unable to drive, has mu Moving from lying on back to sitting on the side of the bed?: None   How much help from another person does the patient currently need. ..   -   Moving to and from a bed to a chair (including a wheelchair)?: None   -   Need to walk in hospital room?: None at modified IND. RPE 6 out of 10 on scale.    Goal #4 Patient will negotiate 6 stairs/one curb w/ assistive device and SBA   Goal #4   Current Status Pt ascends/descends 3 x 3 with 1 rail at supervision    Goal #5 Patient to demonstrate independence with ho

## 2018-12-10 NOTE — CM/SW NOTE
Addend 356p:    ASHISH confirmed w/ Curry Vazquez from [facility] they will have a home RN come to pt's home on Tues 12/11, between 8-9am and RN will contact pt this evening to inform him.      Veronica Yarbrough from [place] stated they will deliver drug and supplies to pt's home today

## 2018-12-10 NOTE — DISCHARGE SUMMARY
[facility] HOSPITALIST  DISCHARGE SUMMARY     Claire Elias Patient Status:  Inpatient    1950 MRN [id number]   Location [facility] 4W/SW/SE Attending Jimmy Jacobs MD   Hosp Day # 10 PCP Nicki Rich.  MD Vernell     DATE OF ADMISSION: 2018 different source. Regardless the patient was placed on appropriate antibiotics under the guidance of infectious disease. He steadily improved.     Patient was seen by general surgery regarding his long-standing enterocutaneous fistula related to previous cooperative    DISCHARGE MEDICATIONS     Discharge Medications      START taking these medications      Instructions Prescription details   dextrose 5 % SOLN 100 mL with Piperacillin Sod-Tazobactam So 3.375 (3-0.375) g SOLR 3.375 g      Inject 3.375 g into tablet  Refills:  0     Ondansetron HCl 8 MG tablet  Commonly known as:  ZOFRAN      Take 1 tablet (8 mg total) by mouth every 8 (eight) hours as needed for Nausea.    Quantity:  30 tablet  Refills:  3     Prochlorperazine Maleate 10 mg tablet  Commonly kno discharge.  -----------------------    Hospital Discharge Diagnoses: Sepsis. Due to Klebsiella and staph aureus bacteremia    Lace+ Score: 86  59-90 High Risk  29-58 Medium Risk  0-28   Low Risk. TCM Follow-Up Recommendation:  LACE > 58:  High Risk of r

## 2018-12-10 NOTE — PROGRESS NOTES
[facility] HOSP - [facility]    Hematology/Oncology   Progress Note    Oleg Dee Patient Status:  Inpatient    1950 MRN [id number]   [facility] Attending Kyle Li MD   Hosp Day # 10 PCP Jose Melgoza

## 2018-12-10 NOTE — PROGRESS NOTES
[facility] ID PROGRESS NOTE    Jaime Roberto Patient Status:  Inpatient    1950 MRN [id number]   [facility] 2W/SW Attending Rochelle Forte MD   Hosp Day # 10 PCP Angeles Buitrago. MD Vernell     Subjective:  Awake, afebrile. Has been eating.  St initial encounter for fracture [facility])     History of bladder cancer     Bone metastases ([address]     Hydronephrosis     Abdominal wound dehiscence     Abdominal wound dehiscence, initial encounter     Brain metastasis ([address]     Bone metastasis ([address]     Entero wbc. CXR without acute path, chronic rib met. Blood cx growing GPC in clusters and GNR. Hypotensive as well requiring pressors, now weaned to 4mcg. Ucx pending. CT A/P ordered. Given vanc x1, continued on zosyn and fluconazole.  ID consulted.     # Septic s

## 2018-12-10 NOTE — PROGRESS NOTES
[facility]    Progress Note    Zacarias Brandt Patient Status:  Inpatient    1950 MRN [id number]   Location [facility] 4W/SW/SE Attending Una Thomas MD   Hosp Day # 10 PCP Barbara Diehl.  MD Vernell     Assessment and Plan: 12/10/2018    HCT 27.1 (L) 12/10/2018    PLT 74 (L) 12/10/2018    CREATSERUM 1.98 (H) 12/09/2018    BUN 18 12/09/2018     12/09/2018    K 3.8 12/10/2018     12/09/2018    CO2 20 (L) 12/09/2018     (H) 12/09/2018    CA 7.4 (L) 12/09/2018

## 2018-12-11 NOTE — TELEPHONE ENCOUNTER
Mary Monroy called the infusion center asking to be scheduled for immunotherapy.  Please let me know what the patient needs

## 2018-12-11 NOTE — TELEPHONE ENCOUNTER
He is scheduled for 12/18 at 1130am, can you schedule education at 1030am, I already let patient know.

## 2018-12-12 NOTE — TELEPHONE ENCOUNTER
PT, [facility], called concerned about PT plan due to h/o bone mets. Per 11/30/18 inpatient PT note, the OP plan should involve:      PT Treatment Plan: Bed mobility; Patient education;Gait training;Neuromuscular re-educate;Strengthening;Stair training;Transfer

## 2018-12-13 NOTE — TELEPHONE ENCOUNTER
Pt discharged from [facility] on 12/10/18 . Please call to schedule follow up with Primary Care Physician.    Thanks

## 2018-12-17 NOTE — TELEPHONE ENCOUNTER
Please call Ebony Lowery [address] [facility] nurse, pt has wound on lower abdomen  Would like to discuss getting referral for wound nurse to advise  Tasked to nursing

## 2018-12-18 PROBLEM — E46 HYPOALBUMINEMIA DUE TO PROTEIN-CALORIE MALNUTRITION (HCC): Status: ACTIVE | Noted: 2018-01-01

## 2018-12-18 PROBLEM — E88.09 HYPOALBUMINEMIA DUE TO PROTEIN-CALORIE MALNUTRITION (HCC): Status: ACTIVE | Noted: 2018-01-01

## 2018-12-18 NOTE — TELEPHONE ENCOUNTER
Daughter called, she doesn't have one of the medications you were having them change so she's thinking perhaps it has another name. Please call her.   [phone]

## 2018-12-18 NOTE — PATIENT INSTRUCTIONS
Medication Education Record: IV Therapy    Learner:  Patient and Family Member    Barriers / Limitations:  None    Diagnosis:   Bladder cancer    IV Cancer Treatment Name(s): Pembrolizumab  IV Cancer Treatment Frequency Once every 3 weeks    Number of cycl directed by your provider):  Prochloperazine (Compazine) 10 mg every 6 hours    Helpful hints during cancer treatment:    Diet:  o Avoid greasy or spicy foods on days surrounding chemotherapy  o Eat small frequent meals per day (6-7 meals) rather than 3 la hours if in the sun and after bathing or sweating. o For dry skin, use an alcohol-free lotion twice per day, especially after baths.  o If scalp hair loss has occurred, protect the scalp from the sun by wearing a hat and use sunscreen.   Apply alcohol-free urine, stool or vomit. Dispose of the used gloves after each use and wash hands with soap and water. 2. Any sheets or clothes soiled with the bodily fluids should be machine washed twice in hot water with regular laundry detergent.   Do not wash soiled ga

## 2018-12-18 NOTE — PROGRESS NOTES
Pt here for C1D1 keytruda.   Arrives Ambulating with walker, accompanied by Family member           Modifications in dose or schedule: No OK to proceed with labs from 12/10/18 and with plts 74k per KELLI Post     Frequency of blood return and site jany

## 2018-12-18 NOTE — PROGRESS NOTES
Medication Education Record: IV Therapy    Learner:  Patient and Family Member    Barriers / Limitations:  None    Diagnosis:   Bladder cancer    IV Cancer Treatment Name(s): Pembrolizumab  IV Cancer Treatment Frequency Once every 3 weeks    Number of cycl directed by your provider):  Prochloperazine (Compazine) 10 mg every 6 hours    Helpful hints during cancer treatment:    Diet:  o Avoid greasy or spicy foods on days surrounding chemotherapy  o Eat small frequent meals per day (6-7 meals) rather than 3 la hours if in the sun and after bathing or sweating. o For dry skin, use an alcohol-free lotion twice per day, especially after baths.  o If scalp hair loss has occurred, protect the scalp from the sun by wearing a hat and use sunscreen.   Apply alcohol-free home health  - provided [phone] fax number to have 12/17/18 labs faxed to me. Verbal report from daughter is Hgb 7.5 and creat 1.7. Reviewed diet - ok to add 1 protein drink, core power or Ensure per day.   Hypoalbuminemia is contributing to BLE edema - 1-2+ oral chemotherapy may be present in vaginal fluid or semen for 48 hours after taking. A condom should be used during this time.   Effective birth control should be used throughout treatment to prevent pregnancy while on these medications and for several mo

## 2018-12-20 NOTE — TELEPHONE ENCOUNTER
To Dr Matt---no call back received yet from [address] [place] [address] for additional information. Ok to order wound nurse?

## 2018-12-23 NOTE — TELEPHONE ENCOUNTER
Patient's daughter called to ask if the patient's labs from [facility] were available for our review and wondering if the patient was again severely anemic. She states that he slept all night. No excess bleeding from the fistula.   Currently patient up an

## 2018-12-24 NOTE — TELEPHONE ENCOUNTER
[facility] the patients home health nurse is calling regarding [address] line he had it installed because he was doing daily antibiotics with the infectious disease doctor, but now he is done.  David Jean-Baptiste is requesting to keep the PICC line since he is using it f

## 2018-12-26 NOTE — TELEPHONE ENCOUNTER
Returned call to [employer], [facility], Home health with [facility] for abx (now Completed)  C1D1 Keytruda 12/18/18    Last week had fatigue, per home health labs Hgb 7.5, pale, lethargic, tired. , on Tuesday than Hgb 8.7 on Wednesday so the

## 2018-12-26 NOTE — TELEPHONE ENCOUNTER
Leon Teixeira, patient's daughter, called stating that original hip pain has returned. Patient started taking 2 tablets 8 mg dilaudid at one time, which makes him \"high\".   Given this SE and uncontrolled pain, Leon Teixeira resumed the Fentanyl 50 mcg so he needs a refi

## 2018-12-27 NOTE — TELEPHONE ENCOUNTER
I returned Maxi's call. She reports Karen Coon called her & cancelled his PT appt for today because he was having too much right hip pain.  I called Karen Sharma & he reports that he is wearing a 50 mcg Fentanyl transdermal patch (changed yesterday) & he has been t

## 2018-12-27 NOTE — TELEPHONE ENCOUNTER
Greg Armstrong the home physical therapist is calling just to notify the doctor that the patient canceled his home PT appointment today.

## 2018-12-27 NOTE — TELEPHONE ENCOUNTER
Returned call to Josie to see if he wanted a MD appt. He says no he wanted to wait to see if current plan will improve the pain control. He will contact Jaydon Costa tomorrow and will be getting a new pain patch on Saturday.   Discussed about RT to right h

## 2018-12-28 NOTE — TELEPHONE ENCOUNTER
Patient calling to report that his right hip pain has returned. After RT, his right hip pain had completely resolved and he was actually off all pain medications for awhile. He re-started the Fentanyl 50 mcg/hour patch this past Sunday.  He changed the p

## 2018-12-31 NOTE — TELEPHONE ENCOUNTER
Pt. Is requesting an increase in [facility]. Services for his fistula his bag is leaking daily ph.  # [phone]   Routed high to clinical

## 2018-12-31 NOTE — TELEPHONE ENCOUNTER
Called patient and relayed DR. LOPZE message - verbalized understanding - RNinstructed patient to call [place] to have them call our office - he states they told him they did - no documentation

## 2018-12-31 NOTE — TELEPHONE ENCOUNTER
[facility], updated him that Dr Alicia Monet does not recommend another scan at this point. He said the pain is worse, I asked him if he has called Estuardo Aguiar, he said that he did, and he has an appt with her next week.   I encouraged him to call her sooner if

## 2018-12-31 NOTE — TELEPHONE ENCOUNTER
Contacted patient. Last seen by Dr. Babar Pena (12/10/2018 progress note) at [address] [place] [address] for fistula/ileostomy. Patient is tearful on the phone tates he changes his ileostomy bag once daily, takes 2 tablets of lomotil QID as well as 4 tablets of imodium BID.  He takes met

## 2018-12-31 NOTE — TELEPHONE ENCOUNTER
Discussed with Dr. Stacy Gottron. Ok to increase both lomotil and imodium doses, patient to contact pharmacy for appropriate instructions on this as to avoid any further complications.     If Dr. Adina Wilson unable to increase his current [place] RN orders, may write orde

## 2018-12-31 NOTE — TELEPHONE ENCOUNTER
Pt states rx for fistula is not working, states he is leaking through bag daily, asking if Dr. Arun Walsh can provide order for  wound RN. Also asking for other rx to help output of fistula be solid. Pls advise thank you.

## 2018-12-31 NOTE — TELEPHONE ENCOUNTER
Thien Masters called and spoke with Ryan Shahid the radiation nurse about his hip pain. He is now experiencing front right hip pain.  They are asking if  would be willing to order a scan for him, Please advise

## 2019-01-01 ENCOUNTER — TELEPHONE (OUTPATIENT)
Dept: INTERNAL MEDICINE CLINIC | Facility: CLINIC | Age: 69
End: 2019-01-01

## 2019-01-01 ENCOUNTER — OFFICE VISIT (OUTPATIENT)
Dept: HEMATOLOGY/ONCOLOGY | Facility: HOSPITAL | Age: 69
End: 2019-01-01
Attending: PHYSICIAN ASSISTANT
Payer: MEDICARE

## 2019-01-01 ENCOUNTER — NURSE ONLY (OUTPATIENT)
Dept: HEMATOLOGY/ONCOLOGY | Facility: HOSPITAL | Age: 69
End: 2019-01-01
Attending: INTERNAL MEDICINE
Payer: MEDICARE

## 2019-01-01 ENCOUNTER — TELEPHONE (OUTPATIENT)
Dept: HEMATOLOGY/ONCOLOGY | Facility: HOSPITAL | Age: 69
End: 2019-01-01

## 2019-01-01 ENCOUNTER — APPOINTMENT (OUTPATIENT)
Dept: ULTRASOUND IMAGING | Facility: HOSPITAL | Age: 69
DRG: 640 | End: 2019-01-01
Attending: EMERGENCY MEDICINE
Payer: MEDICARE

## 2019-01-01 ENCOUNTER — APPOINTMENT (OUTPATIENT)
Dept: MRI IMAGING | Facility: HOSPITAL | Age: 69
DRG: 640 | End: 2019-01-01
Attending: INTERNAL MEDICINE
Payer: MEDICARE

## 2019-01-01 ENCOUNTER — HOSPITAL ENCOUNTER (INPATIENT)
Facility: HOSPITAL | Age: 69
LOS: 4 days | Discharge: HOME HEALTH | DRG: 640 | End: 2019-01-01
Attending: EMERGENCY MEDICINE | Admitting: HOSPITALIST
Payer: MEDICARE

## 2019-01-01 ENCOUNTER — APPOINTMENT (OUTPATIENT)
Dept: CT IMAGING | Facility: HOSPITAL | Age: 69
DRG: 640 | End: 2019-01-01
Attending: EMERGENCY MEDICINE
Payer: MEDICARE

## 2019-01-01 ENCOUNTER — TELEPHONE (OUTPATIENT)
Dept: SURGERY | Facility: CLINIC | Age: 69
End: 2019-01-01

## 2019-01-01 ENCOUNTER — OFFICE VISIT (OUTPATIENT)
Dept: SURGERY | Facility: CLINIC | Age: 69
End: 2019-01-01
Payer: MEDICARE

## 2019-01-01 VITALS
TEMPERATURE: 98 F | HEART RATE: 94 BPM | SYSTOLIC BLOOD PRESSURE: 108 MMHG | WEIGHT: 174 LBS | BODY MASS INDEX: 25.77 KG/M2 | RESPIRATION RATE: 16 BRPM | HEIGHT: 69 IN | DIASTOLIC BLOOD PRESSURE: 61 MMHG

## 2019-01-01 VITALS
WEIGHT: 179 LBS | TEMPERATURE: 98 F | DIASTOLIC BLOOD PRESSURE: 59 MMHG | BODY MASS INDEX: 26.51 KG/M2 | SYSTOLIC BLOOD PRESSURE: 105 MMHG | OXYGEN SATURATION: 99 % | HEIGHT: 69 IN | RESPIRATION RATE: 18 BRPM | HEART RATE: 99 BPM

## 2019-01-01 VITALS
SYSTOLIC BLOOD PRESSURE: 108 MMHG | TEMPERATURE: 98 F | HEART RATE: 94 BPM | RESPIRATION RATE: 16 BRPM | DIASTOLIC BLOOD PRESSURE: 61 MMHG

## 2019-01-01 VITALS
RESPIRATION RATE: 16 BRPM | SYSTOLIC BLOOD PRESSURE: 108 MMHG | WEIGHT: 174.81 LBS | TEMPERATURE: 98 F | HEIGHT: 69 IN | DIASTOLIC BLOOD PRESSURE: 61 MMHG | BODY MASS INDEX: 25.89 KG/M2 | HEART RATE: 94 BPM

## 2019-01-01 VITALS
HEART RATE: 87 BPM | BODY MASS INDEX: 25.92 KG/M2 | RESPIRATION RATE: 18 BRPM | HEIGHT: 69 IN | WEIGHT: 175 LBS | DIASTOLIC BLOOD PRESSURE: 47 MMHG | SYSTOLIC BLOOD PRESSURE: 96 MMHG | OXYGEN SATURATION: 95 % | TEMPERATURE: 98 F

## 2019-01-01 VITALS — WEIGHT: 174 LBS | BODY MASS INDEX: 26 KG/M2

## 2019-01-01 DIAGNOSIS — C67.9 MALIGNANT NEOPLASM OF URINARY BLADDER, UNSPECIFIED SITE (HCC): Primary | ICD-10-CM

## 2019-01-01 DIAGNOSIS — C79.51 BONE METASTASIS (HCC): ICD-10-CM

## 2019-01-01 DIAGNOSIS — C79.51 BONE METASTASES (HCC): ICD-10-CM

## 2019-01-01 DIAGNOSIS — K72.90 LIVER FAILURE WITHOUT HEPATIC COMA, UNSPECIFIED CHRONICITY (HCC): ICD-10-CM

## 2019-01-01 DIAGNOSIS — E80.6 HYPERBILIRUBINEMIA: ICD-10-CM

## 2019-01-01 DIAGNOSIS — E80.6 HYPERBILIRUBINEMIA: Primary | ICD-10-CM

## 2019-01-01 DIAGNOSIS — D64.9 ANEMIA, UNSPECIFIED TYPE: ICD-10-CM

## 2019-01-01 DIAGNOSIS — K63.2 ENTEROCUTANEOUS FISTULA: Primary | ICD-10-CM

## 2019-01-01 DIAGNOSIS — G89.3 CANCER RELATED PAIN: ICD-10-CM

## 2019-01-01 DIAGNOSIS — K80.80 BILIARY CALCULUS OF OTHER SITE WITHOUT OBSTRUCTION: ICD-10-CM

## 2019-01-01 DIAGNOSIS — C67.9 BLADDER CARCINOMA METASTATIC TO BONE (HCC): ICD-10-CM

## 2019-01-01 DIAGNOSIS — C79.51 BLADDER CARCINOMA METASTATIC TO BONE (HCC): ICD-10-CM

## 2019-01-01 DIAGNOSIS — Z51.11 CHEMOTHERAPY MANAGEMENT, ENCOUNTER FOR: ICD-10-CM

## 2019-01-01 DIAGNOSIS — C78.7 LIVER METASTASIS (HCC): ICD-10-CM

## 2019-01-01 LAB
AFP-TM SERPL-MCNC: 2.7 NG/ML (ref 0–8.9)
AFP-TM SERPL-MCNC: 2.9 NG/ML (ref ?–8)
ALBUMIN SERPL BCP-MCNC: 1.4 G/DL (ref 3.5–4.8)
ALBUMIN SERPL BCP-MCNC: 1.5 G/DL (ref 3.5–4.8)
ALBUMIN SERPL BCP-MCNC: 1.7 G/DL (ref 3.5–4.8)
ALBUMIN SERPL BCP-MCNC: 1.8 G/DL (ref 3.5–4.8)
ALBUMIN/GLOB SERPL: 0.4 {RATIO} (ref 1–2)
ALBUMIN/GLOB SERPL: 0.4 {RATIO} (ref 1–2)
ALBUMIN/GLOB SERPL: 0.5 {RATIO} (ref 1–2)
ALBUMIN/GLOB SERPL: 0.5 {RATIO} (ref 1–2)
ALP SERPL-CCNC: 375 U/L (ref 32–100)
ALP SERPL-CCNC: 396 U/L (ref 32–100)
ALP SERPL-CCNC: 408 U/L (ref 32–100)
ALP SERPL-CCNC: 432 U/L (ref 32–100)
ALP SERPL-CCNC: 437 U/L (ref 32–100)
ALP SERPL-CCNC: 476 U/L (ref 32–100)
ALP SERPL-CCNC: 494 U/L (ref 32–100)
ALT SERPL-CCNC: 45 U/L (ref 17–63)
ALT SERPL-CCNC: 48 U/L (ref 17–63)
ALT SERPL-CCNC: 48 U/L (ref 17–63)
ALT SERPL-CCNC: 49 U/L (ref 17–63)
ALT SERPL-CCNC: 51 U/L (ref 17–63)
ALT SERPL-CCNC: 61 U/L (ref 17–63)
ALT SERPL-CCNC: 63 U/L (ref 17–63)
ANION GAP SERPL CALC-SCNC: 10 MMOL/L (ref 0–18)
ANION GAP SERPL CALC-SCNC: 12 MMOL/L (ref 0–18)
ANION GAP SERPL CALC-SCNC: 18 MMOL/L (ref 0–18)
ANION GAP SERPL CALC-SCNC: 6 MMOL/L (ref 0–18)
ANION GAP SERPL CALC-SCNC: 9 MMOL/L (ref 0–18)
ANION GAP SERPL CALC-SCNC: 9 MMOL/L (ref 0–18)
ANTIBODY SCREEN: NEGATIVE
AST SERPL-CCNC: 61 U/L (ref 15–41)
AST SERPL-CCNC: 65 U/L (ref 15–41)
AST SERPL-CCNC: 65 U/L (ref 15–41)
AST SERPL-CCNC: 66 U/L (ref 15–41)
AST SERPL-CCNC: 75 U/L (ref 15–41)
AST SERPL-CCNC: 84 U/L (ref 15–41)
AST SERPL-CCNC: 96 U/L (ref 15–41)
BASOPHILS # BLD: 0 K/UL (ref 0–0.2)
BASOPHILS NFR BLD: 0 %
BASOPHILS NFR BLD: 1 %
BILIRUB DIRECT SERPL-MCNC: 5 MG/DL (ref 0–0.2)
BILIRUB DIRECT SERPL-MCNC: 5.3 MG/DL (ref 0–0.2)
BILIRUB DIRECT SERPL-MCNC: 6.2 MG/DL (ref 0–0.2)
BILIRUB DIRECT SERPL-MCNC: 6.3 MG/DL (ref 0–0.2)
BILIRUB SERPL-MCNC: 11.9 MG/DL (ref 0.3–1.2)
BILIRUB SERPL-MCNC: 11.9 MG/DL (ref 0.3–1.2)
BILIRUB SERPL-MCNC: 12 MG/DL (ref 0.3–1.2)
BILIRUB SERPL-MCNC: 13.4 MG/DL (ref 0.3–1.2)
BILIRUB SERPL-MCNC: 18.5 MG/DL (ref 0.3–1.2)
BILIRUB SERPL-MCNC: 9.4 MG/DL (ref 0.3–1.2)
BILIRUB SERPL-MCNC: 9.9 MG/DL (ref 0.3–1.2)
BLOOD TYPE BARCODE: 9500
BUN SERPL-MCNC: 18 MG/DL (ref 8–20)
BUN SERPL-MCNC: 19 MG/DL (ref 8–20)
BUN SERPL-MCNC: 19 MG/DL (ref 8–20)
BUN SERPL-MCNC: 22 MG/DL (ref 8–20)
BUN SERPL-MCNC: 23 MG/DL (ref 8–20)
BUN SERPL-MCNC: 32 MG/DL (ref 8–20)
BUN/CREAT SERPL: 8.5 (ref 10–20)
BUN/CREAT SERPL: 8.8 (ref 10–20)
BUN/CREAT SERPL: 8.9 (ref 10–20)
BUN/CREAT SERPL: 8.9 (ref 10–20)
BUN/CREAT SERPL: 9.4 (ref 10–20)
BUN/CREAT SERPL: 9.6 (ref 10–20)
BUN/CREAT SERPL: 9.8 (ref 10–20)
BUN/CREAT SERPL: 9.9 (ref 10–20)
CALCIUM SERPL-MCNC: 6.8 MG/DL (ref 8.5–10.5)
CALCIUM SERPL-MCNC: 7.5 MG/DL (ref 8.5–10.5)
CALCIUM SERPL-MCNC: 7.6 MG/DL (ref 8.5–10.5)
CALCIUM SERPL-MCNC: 7.6 MG/DL (ref 8.5–10.5)
CALCIUM SERPL-MCNC: 7.7 MG/DL (ref 8.5–10.5)
CALCIUM SERPL-MCNC: 7.9 MG/DL (ref 8.5–10.5)
CANCER AG19-9 SERPL-ACNC: 126 U/ML (ref 0–35)
CANCER AG19-9 SERPL-ACNC: 130.2 U/ML (ref ?–37)
CHLORIDE SERPL-SCNC: 103 MMOL/L (ref 95–110)
CHLORIDE SERPL-SCNC: 108 MMOL/L (ref 95–110)
CHLORIDE SERPL-SCNC: 111 MMOL/L (ref 95–110)
CHLORIDE SERPL-SCNC: 96 MMOL/L (ref 95–110)
CHLORIDE SERPL-SCNC: 97 MMOL/L (ref 95–110)
CHLORIDE SERPL-SCNC: 98 MMOL/L (ref 95–110)
CMV IGG SERPL QL: NEGATIVE
CMV IGM SERPL QL: NEGATIVE
CO2 SERPL-SCNC: 13 MMOL/L (ref 22–32)
CO2 SERPL-SCNC: 13 MMOL/L (ref 22–32)
CO2 SERPL-SCNC: 16 MMOL/L (ref 22–32)
CO2 SERPL-SCNC: 18 MMOL/L (ref 22–32)
CO2 SERPL-SCNC: 19 MMOL/L (ref 22–32)
CO2 SERPL-SCNC: 19 MMOL/L (ref 22–32)
CO2 SERPL-SCNC: 23 MMOL/L (ref 22–32)
CO2 SERPL-SCNC: 24 MMOL/L (ref 22–32)
CREAT SERPL-MCNC: 2.03 MG/DL (ref 0.5–1.5)
CREAT SERPL-MCNC: 2.13 MG/DL (ref 0.5–1.5)
CREAT SERPL-MCNC: 2.16 MG/DL (ref 0.5–1.5)
CREAT SERPL-MCNC: 2.25 MG/DL (ref 0.5–1.5)
CREAT SERPL-MCNC: 2.4 MG/DL (ref 0.5–1.5)
CREAT SERPL-MCNC: 3.24 MG/DL (ref 0.5–1.5)
EBV NA IGG SER QL IA: POSITIVE
EBV VCA IGG SER QL IA: POSITIVE
EBV VCA IGM SER QL IA: NEGATIVE
EOSINOPHIL # BLD: 0 K/UL (ref 0–0.7)
EOSINOPHIL # BLD: 0.1 K/UL (ref 0–0.7)
EOSINOPHIL # BLD: 0.1 K/UL (ref 0–0.7)
EOSINOPHIL NFR BLD: 1 %
EOSINOPHIL NFR BLD: 2 %
EOSINOPHIL NFR BLD: 2 %
ERYTHROCYTE [DISTWIDTH] IN BLOOD BY AUTOMATED COUNT: 19.2 % (ref 11–15)
ERYTHROCYTE [DISTWIDTH] IN BLOOD BY AUTOMATED COUNT: 19.6 % (ref 11–15)
ERYTHROCYTE [DISTWIDTH] IN BLOOD BY AUTOMATED COUNT: 19.7 % (ref 11–15)
ERYTHROCYTE [DISTWIDTH] IN BLOOD BY AUTOMATED COUNT: 19.8 % (ref 11–15)
ERYTHROCYTE [DISTWIDTH] IN BLOOD BY AUTOMATED COUNT: 19.8 % (ref 11–15)
ERYTHROCYTE [DISTWIDTH] IN BLOOD BY AUTOMATED COUNT: 19.9 % (ref 11–15)
ERYTHROCYTE [DISTWIDTH] IN BLOOD BY AUTOMATED COUNT: 20.2 % (ref 11–15)
GLOBULIN PLAS-MCNC: 3.1 G/DL (ref 2.5–3.7)
GLOBULIN PLAS-MCNC: 3.4 G/DL (ref 2.5–3.7)
GLOBULIN PLAS-MCNC: 4 G/DL (ref 2.5–3.7)
GLOBULIN PLAS-MCNC: 4.1 G/DL (ref 2.5–3.7)
GLUCOSE SERPL-MCNC: 106 MG/DL (ref 70–99)
GLUCOSE SERPL-MCNC: 111 MG/DL (ref 70–99)
GLUCOSE SERPL-MCNC: 114 MG/DL (ref 70–99)
GLUCOSE SERPL-MCNC: 114 MG/DL (ref 70–99)
GLUCOSE SERPL-MCNC: 116 MG/DL (ref 70–99)
GLUCOSE SERPL-MCNC: 116 MG/DL (ref 70–99)
GLUCOSE SERPL-MCNC: 119 MG/DL (ref 70–99)
GLUCOSE SERPL-MCNC: 173 MG/DL (ref 70–99)
HAV IGM SERPL QL IA: NONREACTIVE
HBV CORE IGM SERPL QL IA: NONREACTIVE
HBV SURFACE AG SERPL QL IA: NONREACTIVE
HCT VFR BLD AUTO: 18.4 % (ref 41–52)
HCT VFR BLD AUTO: 22 % (ref 41–52)
HCT VFR BLD AUTO: 22.3 % (ref 41–52)
HCT VFR BLD AUTO: 23 % (ref 41–52)
HCT VFR BLD AUTO: 25 % (ref 41–52)
HCT VFR BLD AUTO: 26.3 % (ref 41–52)
HCT VFR BLD AUTO: 27.3 % (ref 41–52)
HCV AB SERPL QL IA: REACTIVE
HCV RNA SERPL QL NAA+PROBE: NOT DETECTED
HGB BLD-MCNC: 6.1 G/DL (ref 13.5–17.5)
HGB BLD-MCNC: 7.3 G/DL (ref 13.5–17.5)
HGB BLD-MCNC: 7.5 G/DL (ref 13.5–17.5)
HGB BLD-MCNC: 7.5 G/DL (ref 13.5–17.5)
HGB BLD-MCNC: 7.7 G/DL (ref 13.5–17.5)
HGB BLD-MCNC: 8.3 G/DL (ref 13.5–17.5)
HGB BLD-MCNC: 8.4 G/DL (ref 13.5–17.5)
HGB BLD-MCNC: 9 G/DL (ref 13.5–17.5)
INR BLD: 1.7 (ref 0.9–1.2)
LDH SERPL L TO P-CCNC: 183 U/L (ref 98–192)
LYMPHOCYTES # BLD: 0.1 K/UL (ref 1–4)
LYMPHOCYTES # BLD: 0.2 K/UL (ref 1–4)
LYMPHOCYTES # BLD: 0.3 K/UL (ref 1–4)
LYMPHOCYTES NFR BLD: 3 %
LYMPHOCYTES NFR BLD: 4 %
LYMPHOCYTES NFR BLD: 4 %
LYMPHOCYTES NFR BLD: 5 %
LYMPHOCYTES NFR BLD: 6 %
LYMPHOCYTES NFR BLD: 7 %
LYMPHOCYTES NFR BLD: 9 %
MAGNESIUM SERPL-MCNC: 1.1 MG/DL (ref 1.8–2.5)
MAGNESIUM SERPL-MCNC: 1.9 MG/DL (ref 1.8–2.5)
MCH RBC QN AUTO: 30.4 PG (ref 27–32)
MCH RBC QN AUTO: 30.5 PG (ref 27–32)
MCH RBC QN AUTO: 30.7 PG (ref 27–32)
MCH RBC QN AUTO: 30.9 PG (ref 27–32)
MCH RBC QN AUTO: 31 PG (ref 27–32)
MCHC RBC AUTO-ENTMCNC: 31.9 G/DL (ref 32–37)
MCHC RBC AUTO-ENTMCNC: 32.9 G/DL (ref 32–37)
MCHC RBC AUTO-ENTMCNC: 33.1 G/DL (ref 32–37)
MCHC RBC AUTO-ENTMCNC: 33.2 G/DL (ref 32–37)
MCHC RBC AUTO-ENTMCNC: 33.2 G/DL (ref 32–37)
MCHC RBC AUTO-ENTMCNC: 33.3 G/DL (ref 32–37)
MCHC RBC AUTO-ENTMCNC: 33.4 G/DL (ref 32–37)
MCV RBC AUTO: 91.2 FL (ref 80–100)
MCV RBC AUTO: 91.5 FL (ref 80–100)
MCV RBC AUTO: 92.3 FL (ref 80–100)
MCV RBC AUTO: 92.8 FL (ref 80–100)
MCV RBC AUTO: 93.3 FL (ref 80–100)
MCV RBC AUTO: 93.3 FL (ref 80–100)
MCV RBC AUTO: 95.5 FL (ref 80–100)
MITOCHONDRIA AB TITR SER: <20 {TITER}
MONOCYTES # BLD: 0.2 K/UL (ref 0–1)
MONOCYTES # BLD: 0.3 K/UL (ref 0–1)
MONOCYTES # BLD: 0.4 K/UL (ref 0–1)
MONOCYTES # BLD: 0.4 K/UL (ref 0–1)
MONOCYTES # BLD: 0.5 K/UL (ref 0–1)
MONOCYTES NFR BLD: 10 %
MONOCYTES NFR BLD: 10 %
MONOCYTES NFR BLD: 11 %
MONOCYTES NFR BLD: 7 %
MONOCYTES NFR BLD: 8 %
MONOCYTES NFR BLD: 8 %
MONOCYTES NFR BLD: 9 %
NEUTROPHILS # BLD AUTO: 1.3 K/UL (ref 1.8–7.7)
NEUTROPHILS # BLD AUTO: 2.4 K/UL (ref 1.8–7.7)
NEUTROPHILS # BLD AUTO: 2.6 K/UL (ref 1.8–7.7)
NEUTROPHILS # BLD AUTO: 2.9 K/UL (ref 1.8–7.7)
NEUTROPHILS # BLD AUTO: 4.2 K/UL (ref 1.8–7.7)
NEUTROPHILS # BLD AUTO: 5 K/UL (ref 1.8–7.7)
NEUTROPHILS # BLD AUTO: 6.4 K/UL (ref 1.8–7.7)
NEUTROPHILS NFR BLD: 78 %
NEUTROPHILS NFR BLD: 81 %
NEUTROPHILS NFR BLD: 83 %
NEUTROPHILS NFR BLD: 83 %
NEUTROPHILS NFR BLD: 86 %
NEUTROPHILS NFR BLD: 88 %
NEUTROPHILS NFR BLD: 88 %
OSMOLALITY UR CALC.SUM OF ELEC: 275 MOSM/KG (ref 275–295)
OSMOLALITY UR CALC.SUM OF ELEC: 277 MOSM/KG (ref 275–295)
OSMOLALITY UR CALC.SUM OF ELEC: 277 MOSM/KG (ref 275–295)
OSMOLALITY UR CALC.SUM OF ELEC: 279 MOSM/KG (ref 275–295)
OSMOLALITY UR CALC.SUM OF ELEC: 282 MOSM/KG (ref 275–295)
OSMOLALITY UR CALC.SUM OF ELEC: 282 MOSM/KG (ref 275–295)
OSMOLALITY UR CALC.SUM OF ELEC: 284 MOSM/KG (ref 275–295)
OSMOLALITY UR CALC.SUM OF ELEC: 286 MOSM/KG (ref 275–295)
PATIENT FASTING: NO
PATIENT FASTING: NO
PLATELET # BLD AUTO: 113 K/UL (ref 140–400)
PLATELET # BLD AUTO: 122 K/UL (ref 140–400)
PLATELET # BLD AUTO: 60 K/UL (ref 140–400)
PLATELET # BLD AUTO: 72 K/UL (ref 140–400)
PLATELET # BLD AUTO: 77 K/UL (ref 140–400)
PLATELET # BLD AUTO: 79 K/UL (ref 140–400)
PLATELET # BLD AUTO: 95 K/UL (ref 140–400)
PMV BLD AUTO: 10.3 FL (ref 7.4–10.3)
PMV BLD AUTO: 10.3 FL (ref 7.4–10.3)
PMV BLD AUTO: 10.5 FL (ref 7.4–10.3)
PMV BLD AUTO: 11.2 FL (ref 7.4–10.3)
PMV BLD AUTO: 9.4 FL (ref 7.4–10.3)
PMV BLD AUTO: 9.7 FL (ref 7.4–10.3)
PMV BLD AUTO: 9.7 FL (ref 7.4–10.3)
POTASSIUM SERPL-SCNC: 3.4 MMOL/L (ref 3.3–5.1)
POTASSIUM SERPL-SCNC: 3.7 MMOL/L (ref 3.3–5.1)
POTASSIUM SERPL-SCNC: 3.8 MMOL/L (ref 3.3–5.1)
POTASSIUM SERPL-SCNC: 3.9 MMOL/L (ref 3.3–5.1)
POTASSIUM SERPL-SCNC: 4 MMOL/L (ref 3.3–5.1)
POTASSIUM SERPL-SCNC: 4.5 MMOL/L (ref 3.3–5.1)
PROT SERPL-MCNC: 4.5 G/DL (ref 5.9–8.4)
PROT SERPL-MCNC: 4.6 G/DL (ref 5.9–8.4)
PROT SERPL-MCNC: 4.7 G/DL (ref 5.9–8.4)
PROT SERPL-MCNC: 4.8 G/DL (ref 5.9–8.4)
PROT SERPL-MCNC: 5 G/DL (ref 5.9–8.4)
PROT SERPL-MCNC: 5.8 G/DL (ref 5.9–8.4)
PROT SERPL-MCNC: 5.8 G/DL (ref 5.9–8.4)
PROTHROMBIN TIME: 19.5 SECONDS (ref 11.8–14.5)
RBC # BLD AUTO: 1.97 M/UL (ref 4.5–5.9)
RBC # BLD AUTO: 2.41 M/UL (ref 4.5–5.9)
RBC # BLD AUTO: 2.45 M/UL (ref 4.5–5.9)
RBC # BLD AUTO: 2.49 M/UL (ref 4.5–5.9)
RBC # BLD AUTO: 2.68 M/UL (ref 4.5–5.9)
RBC # BLD AUTO: 2.76 M/UL (ref 4.5–5.9)
RBC # BLD AUTO: 2.94 M/UL (ref 4.5–5.9)
RH BLOOD TYPE: NEGATIVE
SODIUM SERPL-SCNC: 131 MMOL/L (ref 136–144)
SODIUM SERPL-SCNC: 132 MMOL/L (ref 136–144)
SODIUM SERPL-SCNC: 132 MMOL/L (ref 136–144)
SODIUM SERPL-SCNC: 133 MMOL/L (ref 136–144)
SODIUM SERPL-SCNC: 133 MMOL/L (ref 136–144)
SODIUM SERPL-SCNC: 134 MMOL/L (ref 136–144)
SODIUM SERPL-SCNC: 134 MMOL/L (ref 136–144)
SODIUM SERPL-SCNC: 135 MMOL/L (ref 136–144)
WBC # BLD AUTO: 1.6 K/UL (ref 4–11)
WBC # BLD AUTO: 3 K/UL (ref 4–11)
WBC # BLD AUTO: 3.1 K/UL (ref 4–11)
WBC # BLD AUTO: 3.4 K/UL (ref 4–11)
WBC # BLD AUTO: 4.9 K/UL (ref 4–11)
WBC # BLD AUTO: 5.6 K/UL (ref 4–11)
WBC # BLD AUTO: 7.3 K/UL (ref 4–11)

## 2019-01-01 PROCEDURE — 99223 1ST HOSP IP/OBS HIGH 75: CPT | Performed by: INTERNAL MEDICINE

## 2019-01-01 PROCEDURE — 30233R1 TRANSFUSION OF NONAUTOLOGOUS PLATELETS INTO PERIPHERAL VEIN, PERCUTANEOUS APPROACH: ICD-10-PCS | Performed by: HOSPITALIST

## 2019-01-01 PROCEDURE — 74176 CT ABD & PELVIS W/O CONTRAST: CPT | Performed by: EMERGENCY MEDICINE

## 2019-01-01 PROCEDURE — 80053 COMPREHEN METABOLIC PANEL: CPT

## 2019-01-01 PROCEDURE — 99212 OFFICE O/P EST SF 10 MIN: CPT | Performed by: SURGERY

## 2019-01-01 PROCEDURE — 99215 OFFICE O/P EST HI 40 MIN: CPT | Performed by: INTERNAL MEDICINE

## 2019-01-01 PROCEDURE — 99233 SBSQ HOSP IP/OBS HIGH 50: CPT | Performed by: HOSPITALIST

## 2019-01-01 PROCEDURE — 76705 ECHO EXAM OF ABDOMEN: CPT | Performed by: EMERGENCY MEDICINE

## 2019-01-01 PROCEDURE — 99214 OFFICE O/P EST MOD 30 MIN: CPT | Performed by: NURSE PRACTITIONER

## 2019-01-01 PROCEDURE — G0463 HOSPITAL OUTPT CLINIC VISIT: HCPCS | Performed by: SURGERY

## 2019-01-01 PROCEDURE — 85025 COMPLETE CBC W/AUTO DIFF WBC: CPT

## 2019-01-01 PROCEDURE — 99239 HOSP IP/OBS DSCHRG MGMT >30: CPT | Performed by: HOSPITALIST

## 2019-01-01 PROCEDURE — 99223 1ST HOSP IP/OBS HIGH 75: CPT | Performed by: HOSPITALIST

## 2019-01-01 PROCEDURE — A4216 STERILE WATER/SALINE, 10 ML: HCPCS

## 2019-01-01 PROCEDURE — 99232 SBSQ HOSP IP/OBS MODERATE 35: CPT | Performed by: SURGERY

## 2019-01-01 PROCEDURE — 96413 CHEMO IV INFUSION 1 HR: CPT

## 2019-01-01 PROCEDURE — 36592 COLLECT BLOOD FROM PICC: CPT

## 2019-01-01 PROCEDURE — 74181 MRI ABDOMEN W/O CONTRAST: CPT | Performed by: INTERNAL MEDICINE

## 2019-01-01 PROCEDURE — 99232 SBSQ HOSP IP/OBS MODERATE 35: CPT | Performed by: INTERNAL MEDICINE

## 2019-01-01 PROCEDURE — 99233 SBSQ HOSP IP/OBS HIGH 50: CPT | Performed by: INTERNAL MEDICINE

## 2019-01-01 RX ORDER — 0.9 % SODIUM CHLORIDE 0.9 %
VIAL (ML) INJECTION
Status: DISCONTINUED
Start: 2019-01-01 | End: 2019-01-01

## 2019-01-01 RX ORDER — HEPARIN SODIUM 5000 [USP'U]/ML
5000 INJECTION, SOLUTION INTRAVENOUS; SUBCUTANEOUS EVERY 12 HOURS SCHEDULED
Status: DISCONTINUED | OUTPATIENT
Start: 2019-01-01 | End: 2019-01-01

## 2019-01-01 RX ORDER — DIPHENOXYLATE HYDROCHLORIDE AND ATROPINE SULFATE 2.5; .025 MG/1; MG/1
1 TABLET ORAL 4 TIMES DAILY PRN
Status: DISCONTINUED | OUTPATIENT
Start: 2019-01-01 | End: 2019-01-01

## 2019-01-01 RX ORDER — ONDANSETRON 2 MG/ML
4 INJECTION INTRAMUSCULAR; INTRAVENOUS EVERY 6 HOURS PRN
Status: DISCONTINUED | OUTPATIENT
Start: 2019-01-01 | End: 2019-01-01

## 2019-01-01 RX ORDER — 0.9 % SODIUM CHLORIDE 0.9 %
VIAL (ML) INJECTION
Status: COMPLETED
Start: 2019-01-01 | End: 2019-01-01

## 2019-01-01 RX ORDER — METOCLOPRAMIDE HYDROCHLORIDE 5 MG/ML
5 INJECTION INTRAMUSCULAR; INTRAVENOUS EVERY 8 HOURS PRN
Status: DISCONTINUED | OUTPATIENT
Start: 2019-01-01 | End: 2019-01-01

## 2019-01-01 RX ORDER — PANTOPRAZOLE SODIUM 40 MG/1
40 TABLET, DELAYED RELEASE ORAL
Qty: 30 TABLET | Refills: 3 | Status: SHIPPED | OUTPATIENT
Start: 2019-01-01

## 2019-01-01 RX ORDER — SODIUM CHLORIDE 9 MG/ML
INJECTION, SOLUTION INTRAVENOUS
Status: COMPLETED
Start: 2019-01-01 | End: 2019-01-01

## 2019-01-01 RX ORDER — POTASSIUM CHLORIDE 14.9 MG/ML
20 INJECTION INTRAVENOUS ONCE
Status: COMPLETED | OUTPATIENT
Start: 2019-01-01 | End: 2019-01-01

## 2019-01-01 RX ORDER — SODIUM CHLORIDE 9 MG/ML
INJECTION, SOLUTION INTRAVENOUS
Status: DISCONTINUED
Start: 2019-01-01 | End: 2019-01-01

## 2019-01-01 RX ORDER — CALCIUM CARBONATE 200(500)MG
1000 TABLET,CHEWABLE ORAL 2 TIMES DAILY PRN
Status: DISCONTINUED | OUTPATIENT
Start: 2019-01-01 | End: 2019-01-01

## 2019-01-01 RX ORDER — HYDROMORPHONE HYDROCHLORIDE 4 MG/1
8 TABLET ORAL EVERY 2 HOUR PRN
Status: DISCONTINUED | OUTPATIENT
Start: 2019-01-01 | End: 2019-01-01

## 2019-01-01 RX ORDER — SODIUM CHLORIDE 9 MG/ML
INJECTION, SOLUTION INTRAVENOUS ONCE
Status: COMPLETED | OUTPATIENT
Start: 2019-01-01 | End: 2019-01-01

## 2019-01-01 RX ORDER — POTASSIUM CHLORIDE 29.8 MG/ML
40 INJECTION INTRAVENOUS ONCE
Status: COMPLETED | OUTPATIENT
Start: 2019-01-01 | End: 2019-01-01

## 2019-01-01 RX ORDER — ACETAMINOPHEN 325 MG/1
650 TABLET ORAL EVERY 6 HOURS PRN
Status: DISCONTINUED | OUTPATIENT
Start: 2019-01-01 | End: 2019-01-01

## 2019-01-01 RX ORDER — LOPERAMIDE HYDROCHLORIDE 2 MG/1
CAPSULE ORAL 4 TIMES DAILY PRN
Qty: 240 CAPSULE | Refills: 3 | Status: SHIPPED | OUTPATIENT
Start: 2019-01-01

## 2019-01-01 RX ORDER — HYDROMORPHONE HYDROCHLORIDE 8 MG/1
8 TABLET ORAL EVERY 2 HOUR PRN
Qty: 80 TABLET | Refills: 0 | Status: SHIPPED | OUTPATIENT
Start: 2019-01-01

## 2019-01-01 RX ORDER — LOPERAMIDE HYDROCHLORIDE 2 MG/1
2 CAPSULE ORAL 4 TIMES DAILY PRN
Status: DISCONTINUED | OUTPATIENT
Start: 2019-01-01 | End: 2019-01-01

## 2019-01-01 RX ORDER — SODIUM CHLORIDE 0.9 % (FLUSH) 0.9 %
3 SYRINGE (ML) INJECTION AS NEEDED
Status: DISCONTINUED | OUTPATIENT
Start: 2019-01-01 | End: 2019-01-01

## 2019-01-01 RX ORDER — PANTOPRAZOLE SODIUM 40 MG/1
40 TABLET, DELAYED RELEASE ORAL
Status: DISCONTINUED | OUTPATIENT
Start: 2019-01-01 | End: 2019-01-01

## 2019-01-01 RX ORDER — SODIUM CHLORIDE 0.9 % (FLUSH) 0.9 %
10 SYRINGE (ML) INJECTION AS NEEDED
Status: DISCONTINUED | OUTPATIENT
Start: 2019-01-01 | End: 2019-01-01

## 2019-01-01 RX ORDER — ACETAMINOPHEN 325 MG/1
650 TABLET ORAL ONCE
Status: COMPLETED | OUTPATIENT
Start: 2019-01-01 | End: 2019-01-01

## 2019-01-02 NOTE — TELEPHONE ENCOUNTER
Please advise Cabrera Jacobo from [facility] called back - she would like order for wound consult for ostomy wound and fistula fax # [phone] - to DR. BARONE

## 2019-01-02 NOTE — TELEPHONE ENCOUNTER
Order for wound consult for fistula and ostomy faxed to Ana Greene from [facility] at [phone]

## 2019-01-04 NOTE — TELEPHONE ENCOUNTER
Lomotil and Imodium use confirmed with  and pt;   New directions 1-2 QID PRN explained to pt and updated on Rx

## 2019-01-07 NOTE — TELEPHONE ENCOUNTER
RE: Lomotil dosing   Received: 1 week ago   Message Contents   MD Samuel Thompson [place]; Dary Zamora, RAIZA             I would choose one medicine, and then titrate up the dose per pharmacy recs, to achieve the desired effect (formed infreque

## 2019-01-07 NOTE — TELEPHONE ENCOUNTER
Faith calling to let Dr Maria Elena Pham know she discharged patient from PT per pt request. Ramonfelicia Ely no longer wants pt. selma

## 2019-01-08 PROBLEM — E80.6 HYPERBILIRUBINEMIA: Status: ACTIVE | Noted: 2019-01-01

## 2019-01-08 PROBLEM — K80.80 BILIARY CALCULUS OF OTHER SITE WITHOUT OBSTRUCTION: Status: ACTIVE | Noted: 2019-01-01

## 2019-01-08 NOTE — CONSULTS
Notified of admit. New hyperbilirubinemia, elevated alk phos. Will need to imaging to rule out obstruction given malignancy. Labs are not c/w pembrolizumab toxicity.

## 2019-01-08 NOTE — PROGRESS NOTES
[facility]  Palliative Care Follow Up    Ar Suarez Patient Status:  Medical Oncology Series    1950 MRN [id number]   [place] [address] Provider SHANON Montesinos     PCP Disha Guerra localized RT, progressive renal failure with hydronephrosis and so the neobladder was discontinued and converted to an ileal conduit, he had wound dehiscence and ileocutaneous fistula and so he he had been on TPN for the past few months and only able to dr provided today for #80. He denies any adverse effects from the Dilaudid. He has been transitioned from the TPN to a PO diet. He describes his appetite as \"medium. \"  There is no nausea or vomiting.  He continues to have copious output from his ostomy w Cap Take 1-2 capsules (2-4 mg total) by mouth 4 (four) times daily as needed for Diarrhea. fentaNYL 50 MCG/HR Transdermal Patch 72 Hr Place 1 patch onto the skin every third day.    diphenoxylate-atropine 2.5-0.025 MG Oral Tab 1 - 2 tablets by mouth QID a No             Palliative Care Assessment/Plan:        Metastatic Bladder Cancer  -Follows with Dr. Alma Otto related pain  -Continue Dilaudid 8 mg PO Q 2 hours PRN breakthrough pain (script provided today for #80)  -Narcotic Agreement completed on

## 2019-01-08 NOTE — PATIENT INSTRUCTIONS
Fentanyl Patches    Patient Information on the Use of Fentanyl Patches  This information will be useful to you if you have been prescribed fentanyl patches for pain relief.   It describes how to use them properly and answers some questions you may have when • When you use fentanyl patches for the first time, or if your dose is increased, it can take a day or more for you to feel the maximum benefit.    • You may experience pain while doing a particular activity despite being on the patch e.g. washing, walking Seek medical advice if you develop a temperature. If a patch is accidently transferred to another adult or a child, remove it immediately and seek medical advice.   Store patches safely and out of sight and reach of children    Different brands of patch  T

## 2019-01-08 NOTE — PROGRESS NOTES
Patient here for Lab draw from PICC line. Patient states PICC line dressing was done last Monday via 1 beqom. Stated that they didn't come yesterday.   Home Care was scheduled to come today, but since patient was coming for treatment today he told then

## 2019-01-08 NOTE — PROGRESS NOTES
Pt here for Afshin Alvarez. Arrives Ambulating independently with assistance from walker, accompanied by Family member - daughter    Modifications in dose or schedule: No.  Dr. Aristeo Alvarado aware of lab results from 1/8.   Ok to treat with bilirubin of 12.0     Jean Claude

## 2019-01-08 NOTE — H&P
[facility]    PATIENT'S NAME: Richarnymihaela Body PHYSICIAN: Lupillo Wagner MD   PATIENT ACCOUNT#:   [id number]    LOCATION:  [place] [address]  MEDICAL RECORD #:   [id number]       YOB: 1950  ADMISSION DATE:       01/08/20 bilateral hydronephrosis at that time. His neobladder was discontinued, and he had an ileal conduit. Developed wound dehiscence and ileocutaneous fistula. At that time, started on TPN.   Eventually TPN was discontinued, and he has been following with Gen range of motion. LUNGS:  Clear to auscultation bilaterally. Normal respiratory effort. No intercostal retractions. HEART:  Regular rate and rhythm. S1 and S2 auscultated. No murmur. ABDOMEN:  Soft, nondistended. No tenderness.   Enterocutaneous f

## 2019-01-08 NOTE — ED INITIAL ASSESSMENT (HPI)
Pt sent from his pmd and sent here from his pmd for elevated bilirubin. Pt does have hx of metas tic cancer to bone from bladder. Pt is orange in color not his norm.  Pt denies pain

## 2019-01-08 NOTE — PROGRESS NOTES
Cancer Center Progress Note    Patient Name: Carola Summers   YOB: 1950   Medical Record Number: [id number]   Attending Physician: Nathalie Lowry M.D.      Chief Complaint:  Metastatic bladder cancer with bone metastasis    History of Present [place] History:   Diagnosis Date   • Abscess 2013    s/p neobladder   • Anemia, iron deficiency 2008   • Sheppard's esophagus    • Basal cell carcinoma    • Bladder cancer [facility] 2013    Bladder Surgery- Neobladder   • Calculus of kidney    • Headache    • History Weight Concern: Not Asked        Special Diet: Not Asked        Back Care: Not Asked        Exercise: Not Asked        Bike Helmet: Not Asked        Seat Belt: Not Asked        Self-Exams: Not Asked    Social History Narrative      Not on file      Current Patient is alert and oriented x 3, not in acute distress. Psych:  Mood and affect appropriate  HEENT: EOMs intact. PERRL. Oropharynx is clear. Neck: No JVD. No palpable lymphadenopathy. Neck is supple. Lymphatics:  There is no palpable peripheral lympha Although his PD-L1 is negative it is still possible he can respond to immunotherapy (ie DDR, RB1 mutations).   Other systemic treatment options are limited given his issues with wound healing/fistulas  –iron deficiency anemia stable  –following with gen sabiha

## 2019-01-08 NOTE — ED NOTES
Report given to [facility], 4th floor. Patient in [address] then up to floor.

## 2019-01-08 NOTE — ED NOTES
Karen Sharma arrives with c/o elevated bilirubin. Had immunotherapy session this AM and had blood drawn. Daughter reports he has had yellowish skin since [place] time. Patient denies pain. Picc line noted to LYLY IVF infusing. CT ready.    Patient tearful, da

## 2019-01-09 NOTE — PHYSICAL THERAPY NOTE
Pt was to be seen for PT initial evaluation. HGB is up to 7.5. Consulted w/ RN prior to seeing pt. Per RN, pt has been tearful and feeling hopeless about his situation. Visited pt in room. Pt was received supine in bed w/ 2 visitors present in room.  Pt ref

## 2019-01-09 NOTE — CONSULTS
Date of consult 1/9/2019    Inpatient Oncology consultation    Chief Complaint:  Hyperbilirubinemia metastatic bladder cancer with bone metastasis    History of Present Illness:  Cancer history  21-year-old male, never smoker, with metastatic bladder cance discouraged overall    Performance Status:  ECOG 1  Past Medical History:  Past Medical History:   Diagnosis Date   • Abscess 2013    s/p neobladder   • Anemia, iron deficiency 2008   • Sheppard's esophagus    • Basal cell carcinoma    [facility] Hazards: Not Asked        Sleep Concern: Not Asked        Stress Concern: Not Asked        Weight Concern: Not Asked        Special Diet: Not Asked        Back Care: Not Asked        Exercise: Not Asked        Bike Helmet: Not Asked        Seat Belt: Not A 03/16/2016     (L) 01/09/2019    K 3.4 01/09/2019     (H) 01/09/2019    CO2 16 (L) 01/09/2019       Radiology:  CT abdomen pelvis personally reviewed.   There is no evidence of liver metastasis or biliary obstruction on noncontrasted imaging of potential autoimmune/viral causes.   Bilirubin slightly improved today   –non-anion gap metabolic acidosis: Likely due to GI losses    Goals of care: He is currently full code he does follow with palliative care    MDM: High risk, metastatic bladder canc

## 2019-01-09 NOTE — PROGRESS NOTES
[facility]    Progress Note    Harshil Headley Patient Status:  Inpatient    1950 MRN [id number]   Location [facility] 4W/SW/SE Attending Manuel Gr MD   Hosp Day # 1 PCP Raghav Matt MD     Assessment and Plan: 98 °F (36.7 °C) (Oral)   Resp 18   Ht 175.3 cm (5' 9\")   Wt 175 lb (79.4 kg)   SpO2 97%   BMI 25.84 kg/m²   Gen:  NAD  Abd:  Soft, NT, ND, ileoconduit unchanged, ilestomy with less protrusion when pt is supine.     Results:     Lab Results   Component Valu mass/lesion or intrahepatic ductal dilatation. 2. Abnormal appearance of the gallbladder which is distended. There are dependent stones or echogenic sludge. Findings suggest biliary dyskinesia or acute cholecystitis. Correlate clinically.  Common bile duct

## 2019-01-09 NOTE — WOUND PROGRESS NOTE
Wound Care Services  Nursing consult to see the pt.  the pt. is resting in bed, he is jaundiced, he is weepy, appears sad, the pt. is known to wound care services from previous admissions.  Urostomy and Ileostomy appliances are intact with no leaking at [facility]

## 2019-01-09 NOTE — PROGRESS NOTES
Pt well known to me from previous hospitalizations for postoperative complications as a result of his neobladder conversion to an ileoconduit. Pt now jaundiced and reports increased protrusion of midline intestinal lumen.     Wt 174 lb (78.9 kg)   BMI 25

## 2019-01-09 NOTE — CM/SW NOTE
SW received order for Advanced Directives and request that pt wishes to be DNR. Pt is currently listed as full code. SW informed RN that MD will need to discuss code status w/ pt. SW met w/ pt to discuss eventual discharge needs.  Pt lives at home alone

## 2019-01-09 NOTE — CDS QUERY
.Clinical Significance – Hematology Results  Ezella Bamberger  Dear Doctor:  Daren     Please address these abnormal hematology results    * RBC was 2.68 then dropped to 1.97  * HGB was 8.3 then dropped to 6.1  * Platelets were 9

## 2019-01-09 NOTE — PHYSICAL THERAPY NOTE
PT eval orders received, chart reviewed. Per EMR and Discussion with Marylee Buffy, pt's hgb has dropped to 6.1 from 8.4. Pt will be provided with blood transfusion this am. Will f/u for PT eval later today or tomorrow as appropriate. RN is aware. Thank You.

## 2019-01-09 NOTE — OCCUPATIONAL THERAPY NOTE
Orders received, chart reviewed for occupational therapy evaluation. Pt with low hemoglobin (6.1) --discussed with pt's RN who reported that pt to have 1 unit transfusion.  RN agreeable for therapy to re-attempt this afternoon or tomorrow as pt is appropria

## 2019-01-10 NOTE — PHYSICAL THERAPY NOTE
Chart reviewed, pt cleared for PT evaluation by RN. Pt received seated in bedside chair.  Pt reporting he has been ambulating in hallways and sitting up in chair, has no concerns about mobility for d/c home, feels he can perform stair climbing etc. Independ

## 2019-01-10 NOTE — PROGRESS NOTES
[facility]    Progress Note    Radha Gee Patient Status:  Inpatient    1950 MRN [id number]   Location [facility] 4W/SW/SE Attending [place] Maria MD   Hosp Day # 1 PCP Christ Johnson.  MD Vernell       Subjective:     Pt saad Nodular hepatic contour, raising suspicion for early cirrhosis. There is a dominant 1.8 cm indeterminate hypoechoic lesion in the left hepatic lobe. Additional subcentimeter hypoechoic lesions are also noted and may reflect regenerative nodules.  Suggest ba Keith Dougherty MD on 1/08/2019 at 16:30                Assessment and Plan:     Severe metabolic acidosis, most likely related to small bowel leakage through the enterocutaneous fistula and ileostomy and bicarbonate loss.    - cont IVF with bicarb in them  - franko

## 2019-01-10 NOTE — OCCUPATIONAL THERAPY NOTE
Orders received, chart reviewed. RN cleared pt for participation in OT activity. Pt refusing therapy services at this time --pt reported no needs and not wishing to participate. Will DC from IP OT services.  Please re-consult if pt status changes and he is

## 2019-01-10 NOTE — PROGRESS NOTES
[facility]    Hematology/Oncology   Progress Note    Marilee Cap Patient Status:  Inpatient    1950 MRN [id number]   Location [facility] 4W/SW/SE Attending Loreto Sequeira, [address] [facility] Day # 2 PCP Marta Rosales.  MD Vernell     S contrast on a nonemergent basis for definitive characterization. 4. Small to moderate volume of abdominal ascites. 5. Stable moderate right hydronephrosis.   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 He received palliative radiotherapy to symptomatic clival region at the [employer] [facility].   He has chronic renal insufficiency  –started carbopl

## 2019-01-10 NOTE — PROGRESS NOTES
GI  PROGRESS NOTE    SUBJECTIVE: denies abd pain; tolerating diet.      OBJECTIVE:  Temp:  [97.7 °F (36.5 °C)-97.8 °F (36.6 °C)] 97.8 °F (36.6 °C)  Pulse:  [81-99] 99  Resp:  [18-20] 18  BP: (100-104)/(50-53) 104/53  Exam  Gen: No acute distress, alert an history of brb via ileostomy. ileocutaneous fistulas without overt bleeding presently. Pancytopenia. POSITIVE Hep C Ab. EBV/CMV negative. PLAN: 1.) Hep C RNA confirmatory/genotype; AFP; MRCP    2.) may need liver biopsy pending above   3.) AMA pending.

## 2019-01-10 NOTE — PROGRESS NOTES
[facility]    Progress Note    Ar Suarez Patient Status:  Inpatient    1950 MRN [id number]   Location [facility] 4W/SW/SE Attending Krystina Burt, [facility] Day # 2 PCP Disha Guerra.  MD Vernell       Subjective:     No pain are also noted and may reflect regenerative nodules. Suggest baseline MRI of the abdomen without and with contrast on a nonemergent basis for definitive characterization. 4. Small to moderate volume of abdominal ascites.  5. Stable moderate right hydronephr and bicarbonate loss. Bicarb improved. - cont IVF with bicarb in them  - monitor BMP  - general surgery on consult for fistula     Dehydration.    - cont IVF as olya.     Jaundice of unclear etiology.  Picture is suggestive of obstructive jaundice, but C

## 2019-01-11 NOTE — CM/SW NOTE
SW met w/ pt to discuss eventual discharge needs. Pt is current w/ [facility] and stated he is uncertain if he wants to continue w/ their services. Pt stated he is agreeable w/ tentative referral to [facility] to ask them q's about their [address].  SW spoke w/ Be

## 2019-01-11 NOTE — PROGRESS NOTES
[facility]    Hematology/Oncology   Progress Note    Radha Gee Patient Status:  Inpatient    1950 MRN [id number]   Location [facility] 4W/SW/SE Attending Pamela Maria,  [facility] Day # 3 PCP Christ Johnson.  MD Vernell     S characterization. 4. Small to moderate volume of abdominal ascites. 5. Stable moderate right hydronephrosis.   Dictated by (CST): Hilda Kuhn MD on 1/08/2019 at 17:44     Approved by (CST): Hilda Kuhn MD on 1/08/2019 at 17:48          Ct Abdomen+pe with Dr. Dexter Braun for T1 BCG refractory disease. He received palliative radiotherapy to symptomatic clival region at the [address] [place] [address]. He has chronic renal insufficiency  –started carboplatin gemcitabine 11/2/18.   [facility]

## 2019-01-11 NOTE — PROGRESS NOTES
GI  PROGRESS NOTE    SUBJECTIVE: denies abd pain;     OBJECTIVE:  Temp:  [97.4 °F (36.3 °C)-98.4 °F (36.9 °C)] 97.5 °F (36.4 °C)  Pulse:  [81-91] 91  Resp:  [18-20] 20  BP: ()/(49-55) 101/54  Exam  Gen: No acute distress, alert and oriented x3  Pul jaundice, Cholestatic/obstructive patter. No obvious liver metastasis on CT/US. ? Nodular hepatic contour suggested on US. Reported history of brb via ileostomy. ileocutaneous fistulas without overt bleeding presently. Pancytopenia.  POSITIVE Hep C Ab. ELEV

## 2019-01-11 NOTE — WOUND PROGRESS NOTE
Wound and Ostomy Care Services  Following up on the pt. assessing his urostomy appliance and mid abdomen ileostomy/ fistula appliance, both are on with a seal, no leaking, the urostomy appliance is connected to a barton pouch, the ileostomy medium size fist

## 2019-01-12 NOTE — PROGRESS NOTES
[place] [facility]    Progress Note    Tonya Zamarripa Patient Status:  Inpatient    1950 MRN [id number]   Location [facility] 4W/SW/SE Attending Dayana France,  [facility]   Day # 3 PCP Gabriella Matt MD       Subjective:     No abd Renal cysts are present. 8. Lesser incidental findings as above.    Dictated by (CST): Desiree Wong MD on 1/11/2019 at 16:48     Approved by (CST): Desiree Wong MD on 1/11/2019 at 17:06                Assessment and Plan:     Severe metabolic acidosi

## 2019-01-12 NOTE — PROGRESS NOTES
[facility]    Hematology/Oncology   Progress Note    Myrna Phiilp Patient Status:  Inpatient    1950 MRN [id number]   Location [facility] 4W/SW/SE Attending Teagan Kang, [facility]  Day # 3 PCP Teresita Fernandez.  MD Vernell     S 16: 48     Approved by (CST): Jihan Munson MD on 1/11/2019 at 17:06              Medications reviewed. Assessment and Plan:  78-year-old male with metastatic bladder cancer with bone metastasis PD-L1 0% as outlined above.   He was diagnosed with metast

## 2019-01-12 NOTE — PROGRESS NOTES
GI  PROGRESS NOTE    SUBJECTIVE: denies abd pain;     OBJECTIVE:  Temp:  [97.5 °F (36.4 °C)-98.3 °F (36.8 °C)] 97.9 °F (36.6 °C)  Pulse:  [87-99] 87  Resp:  [18-20] 18  BP: ()/(47-55) 96/47  Exam  Gen: No acute distress, alert and oriented x3  Pulm who is presently admitted with worsening jaundice, Cholestatic/obstructive patter. No obvious liver metastasis on CT/US. ? Nodular hepatic contour suggested on US. Reported history of brb via ileostomy.  ileocutaneous fistulas without overt bleeding present

## 2019-01-12 NOTE — PROGRESS NOTES
[facility]    Hematology/Oncology   Progress Note    Myrna Philip Patient Status:  Inpatient    1950 MRN [id number]   Location [facility] 4W/SW/SE Attending Teagan Kang, [facility]  Day # 4 PCP Teresita Fernandez.  MD Vernell     S ascites. 7. Asymmetric right renal cortical atrophy. Renal cysts are present. 8. Lesser incidental findings as above.    Dictated by (CST): Kenyetta Krishna MD on 1/11/2019 at 16:48     Approved by (CST): Kenyetta Krishna MD on 1/11/2019 at 17:06 to transition to hospice. Discussed at bedside. At this point from oncology standpoint can discharged.         Goals of care: long discussion. Changed to DNR per patient wishes. Can can potentially set up home hospice as outpatient next week.

## 2019-01-12 NOTE — HOME CARE LIAISON
Received referral from Noemy Fermin, for residential home health. Met with patient at the bedside. Patient is agreeable to [facility] services at discharge. Patient given brochure and liaison card. All questions and concerns were addressed.  Will con

## 2019-01-13 NOTE — DISCHARGE SUMMARY
[place] [facility] - [facility]    Discharge Summary    Kimani Huizar Patient Status:  Inpatient    1950 MRN [id number]   Location [facility] 4W/SW/SE Attending No att. providers found   Hosp Day # 4 PCP Stewart Richey.  MD Vernell     Date of Admissi Gastrointestinal hemorrhage     Gastrointestinal hemorrhage, unspecified gastrointestinal hemorrhage type     Acute kidney injury ([address]     Metabolic acidosis     Sepsis due to urinary tract infection ([address]     Sepsis ([address]     Septic shock ([address]     Christie Dejesus of 13, BUN 23, creatinine 2.40. Estimated GFR of 27, which is slightly below his baseline. Glucose is 173. CT scan of the abdomen done showed no clear evidence of visualized liver mass or intrahepatic ductal dilation.   There were dependent stones or ech these medications      Instructions Prescription details   diphenoxylate-atropine 2.5-0.025 MG Tabs  Commonly known as:  LOMOTIL      1 - 2 tablets by mouth QID as needed for diarrhea   Quantity:  120 tablet  Refills:  1     HYDROmorphone HCl 8 MG Tabs  Co Specialties:  Hematology and Oncology, ONCOLOGY, HEMATOLOGY  Contact information:  [address] [address] [phone]             Yuriy Alex MD In 1 week.     Specialty:  Internal Medicine  Contact information:  [address]

## 2019-01-14 NOTE — TELEPHONE ENCOUNTER
Called Jaime Ochoa to let him know to arrive at 3pm for labs from his picc before his MD appt at 4pm.  Patient verbalizes understanding.

## 2019-01-14 NOTE — TELEPHONE ENCOUNTER
Rashid Herrera calling to make f/u appt after hospital discharge. He wants to know if labs need to be done prior.  He is on the schedule 1/15 at 4pm. selma

## 2019-01-15 NOTE — PROGRESS NOTES
Pt here for picc dressing change and labs. He arrives late due to home health care visited, opened up care for him today. They plan to continue picc care at his home. Labs drawn, picc dressing changed. Pt tolerated well.     Discharged stable to [facility],

## 2019-01-15 NOTE — PROGRESS NOTES
Cancer Center Progress Note    Patient Name: Kristen Hopkins   YOB: 1950   Medical Record Number: [id number]   Attending Physician: Giovanni Dejesus M.D.      Chief Complaint:  Metastatic bladder cancer with bone metastasis    History of Present [place] History:  Past Medical History:   Diagnosis Date   • Abscess 2013    s/p neobladder   • Anemia, iron deficiency 2008   • Sheppard's esophagus    • Basal cell carcinoma    • Bladder cancer [facility] 2013    Bladder Surgery- Neobladder   • Calculus of kidney    • Concern: Not Asked        Weight Concern: Not Asked        Special Diet: Not Asked        Back Care: Not Asked        Exercise: Not Asked        Bike Helmet: Not Asked        Seat Belt: Not Asked        Self-Exams: Not Asked    Social History Narrative not in acute distress. Psych:  Mood and affect appropriate  HEENT: EOMs intact. PERRL. Oropharynx is clear. Neck: No JVD. No palpable lymphadenopathy. Neck is supple. Lymphatics:  There is no palpable peripheral lymphadenopathy   Chest: Symmetric expans possible he can respond to immunotherapy (ie DDR, RB1 mutations).   Other systemic treatment options are limited given his issues with wound healing/fistulas  –iron deficiency anemia stable  –following with gen surg for abd wound/fistula  –hyperbilirubinemi

## 2019-01-17 NOTE — TELEPHONE ENCOUNTER
Francesca Figueroa called from [facility], pt refusing therapy, she is requesting start of care orders and wound care  Francescaindigo Figueroa also has questions about pt, hoping to discuss with nursing this morning  Tasked to nursing

## 2019-01-17 NOTE — TELEPHONE ENCOUNTER
To Dr. Vickie Goodman - see below. Johnathan Carbajal will continue with skilled nursing and wound care nurse. Pt refuses social work/hospice at this time but daughter will talk to pt and hopefully that will change. On immunotherapy - bilirubin 18.   Per oncologist: no more

## 2019-01-18 NOTE — TELEPHONE ENCOUNTER
Gurdeep Gomez with [facility] and relayed DR. LIZABETH Mcintosh ,also LMTCB with questions

## 2019-01-20 NOTE — TELEPHONE ENCOUNTER
I did speak to Van's daughter, Orin Miramontes, and Elle Romero is slowly slipping and semi-responsive. I reviewed worsening renal and liver function with Doeamandeep Warrenmihaela.

## 2019-01-21 NOTE — TELEPHONE ENCOUNTER
Evelin with [facility] hospice contacted and relayed 's message and ok for hospice evaluation. Laura De Leon said no office visit note is needed, they have everything that they need.

## 2019-01-21 NOTE — TELEPHONE ENCOUNTER
Per Dr Cheryl Brar \"I just spoke with pts daughter not to long ago. OK to start Hospice. Depending on his needs, I can follow him. I just saw Camille Sierra 12/19, OK to send this encounter to Hospice RN. \"

## 2019-01-21 NOTE — TELEPHONE ENCOUNTER
Please call [facility] at [phone]  Pt declining rapidly over weekend  Pt last ate Friday evening, not able to eat or drink  Requesting orders for Hospice  Verbal order to evaluate and treat  Will Dr Romel Nunez continue to follow or should they use hospice physician?   Tasked to nursing

## 2019-01-21 NOTE — CONSULTS
[facility]    Progress Note    [facility] Patient Status:  Inpatient    1950 MRN [id number]   Location [facility] 4W/SW/SE Attending Ciaran Juarez MD   Hosp Day # 1 PCP Fritz Curling.  MD Vernell     Assessment and Plan: 98 °F (36.7 °C) (Oral)   Resp 18   Ht 175.3 cm (5' 9\")   Wt 175 lb (79.4 kg)   SpO2 97%   BMI 25.84 kg/m²   Gen:  NAD  Abd:  Soft, NT, ND, ileoconduit unchanged, ilestomy with less protrusion when pt is supine.     Results:     Lab Results   Component Valu mass/lesion or intrahepatic ductal dilatation. 2. Abnormal appearance of the gallbladder which is distended. There are dependent stones or echogenic sludge. Findings suggest biliary dyskinesia or acute cholecystitis. Correlate clinically.  Common bile duct

## 2019-01-22 NOTE — TELEPHONE ENCOUNTER
Hospice nurse has a home visit today with patient at 3:00. She needs to go over medications & office notes, as well as a verbal order.

## 2019-01-22 NOTE — TELEPHONE ENCOUNTER
Spoke with Kelsey Gillespie with [facility]  and relayed Dr. Melanie Lange earlier message \"palliative care referral.\" This message was relayed on Chloé's VM (hospice). Courtney verbalized understanding. States patient is refusing hospice care.  He would like to continue

## 2019-01-22 NOTE — TELEPHONE ENCOUNTER
Spoke with [employer]. Pt rescheduled for ext week. Requesting verbal order for palliative consult. Hx bladder CA. RFC please advise if ok to give verbal order. Will verify meds next week.

## 2019-01-22 NOTE — TELEPHONE ENCOUNTER
Brad Hunting / [facility]  Pt declined Hospice care  Pt family is curious about Palliative care would like an order to evaluate pt Palliative care   Tasked to nursing

## 2019-01-23 NOTE — TELEPHONE ENCOUNTER
Please call Radhika/[facility] She requesting standing order for Hospice from Dr Jose Juan Ramos is hospice appropriate, declining hospice at this time  BP this morning was 60/30  Thought it would be helpful to go into weekend with this order in mary

## 2019-01-24 NOTE — TELEPHONE ENCOUNTER
Provided verbal order for standing hospice order to Barbie with [facility]. She verbalized understanding.

## 2019-01-25 NOTE — TELEPHONE ENCOUNTER
I visited Josie at his home on 1/23/2019 at about 3 pm.  His sister was present - he has someone in the home 24 hours per day. We had a jerry discussion regarding death and dying and knows that he is close to death but \"doesn't want to leave\".  I advise

## 2019-01-29 ENCOUNTER — APPOINTMENT (OUTPATIENT)
Dept: HEMATOLOGY/ONCOLOGY | Facility: HOSPITAL | Age: 69
End: 2019-01-29
Attending: INTERNAL MEDICINE
Payer: MEDICARE

## 2019-01-29 ENCOUNTER — TELEPHONE (OUTPATIENT)
Dept: INTERNAL MEDICINE CLINIC | Facility: CLINIC | Age: 69
End: 2019-01-29

## 2019-01-30 NOTE — TELEPHONE ENCOUNTER
Death certificate signed and faxed back to [phone], conformation received. Original sent to scan.

## 2019-02-19 ENCOUNTER — APPOINTMENT (OUTPATIENT)
Dept: HEMATOLOGY/ONCOLOGY | Facility: HOSPITAL | Age: 69
End: 2019-02-19
Attending: INTERNAL MEDICINE
Payer: MEDICARE

## 2020-07-31 NOTE — PLAN OF CARE
DISCHARGE PLANNING    • Discharge to home or other facility with appropriate resources Progressing        GENITOURINARY - ADULT    • Absence of urinary retention Progressing        Impaired Functional Mobility    • Achieve highest/safest level of mobility/ MILD - TO FOLLOW - IF HE HAS CAT SURGERY WOULD RECOM EVAL AND ANTI-VEGF TX FIRST TO BE ON THE SAFE SIDE.

## 2020-11-10 NOTE — ED PROVIDER NOTES
Patient Seen in: [facility] AND [facility] Emergency Department    History   Patient presents with:  Abnormal Result (metabolic, cardiac)    Stated Complaint:     HPI    70yoM with history of metastatic bladder cancer with bone and brain metastases, CRF, hyperte Negative for dysuria, flank pain and frequency. Musculoskeletal: Negative for back pain. Skin: Positive for color change. Negative for rash. Neurological: Negative for weakness, light-headedness and headaches.    All other systems reviewed and are neg Alkaline Phosphatase 494 (H) 32 - 100 U/L    Bilirubin, Total 12.0 (H) 0.3 - 1.2 mg/dL    Total Protein 5.8 (L) 5.9 - 8.4 g/dL    Albumin 1.8 (L) 3.5 - 4.8 g/dL    Globulin 4.0 (H) 2.5 - 3.7 g/dL    A/G Ratio 0.5 (L) 1.0 - 2.0    Anion Gap 10 0 - 18 mmol/L 31.9 (L) 32.0 - 37.0 g/dl    RDW 19.8 (H) 11.0 - 15.0 %     (L) 140 - 400 K/UL    MPV 11.2 (H) 7.4 - 10.3 fL    Neutrophil % 88 %    Lymphocyte % 3 %    Monocyte % 8 %    Eosinophil % 1 %    Basophil % 0 %    Neutrophil Absolute 5.0 1.8 - 7.7 K/UL condition was fair during Emergency Department evaluation.      42FON with elevated Tbili  - I personally reviewed and interpreted all the ED vitals  - afebrile, hemodynamically stable  - I ordered and personally reviewed the labs and imaging and found no l Gastrointestinal hemorrhage; Gastrointestinal hemorrhage, unspecified gastrointestinal hemorrhage type; Acute kidney injury ([address] Metabolic acidosis; Sepsis due to urinary tract infection ([address] Sepsis ([address] Septic shock ([address]  Urinary tract infection wi DISPLAY PLAN FREE TEXT DISPLAY PLAN FREE TEXT DISPLAY PLAN FREE TEXT DISPLAY PLAN FREE TEXT

## 2021-01-13 NOTE — TELEPHONE ENCOUNTER
I spoke to Kyung Magdaleno is currently hospitalized. Marco Park would like to speak to nurse concerning a radiation schedule for [facility]. Marco Park can be reached at [phone]. Please advise.
pt states had ct chest done at outpt center today and results show pe referred to er denies shortness of breath c/o chest discomfort per pt had negative covid test today bp elevated in triage hx htn missed bp meds x 3 days per pt

## 2021-04-15 NOTE — PLAN OF CARE
DISCHARGE PLANNING    • Discharge to home or other facility with appropriate resources Progressing        GASTROINTESTINAL - ADULT    • Minimal or absence of nausea and vomiting Progressing        PAIN - ADULT    • Verbalizes/displays adequate comfort leve
No change

## 2023-10-19 NOTE — CM/SW NOTE
ASHISH met with the pt. At bedside. The pt. Lives alone in a 2 level home with the bedrooms on the 2nd floor. The pt. Reports being independent prior to admission with adls and ambulation. The pt. Has not been driving recently. The  Pt.  Has children and fa 21-Dec-2022

## 2025-02-13 NOTE — PLAN OF CARE
GASTROINTESTINAL - ADULT    • Minimal or absence of nausea and vomiting Adequate for Discharge    • Maintains or returns to baseline bowel function Adequate for Discharge    • Maintains adequate nutritional intake (undernourished) Adequate for Discharge
GASTROINTESTINAL - ADULT    • Minimal or absence of nausea and vomiting Progressing    • Maintains or returns to baseline bowel function Progressing    • Maintains adequate nutritional intake (undernourished) Progressing    • Achieves appropriate nutrition
Problem: ALTERED NUTRIENT INTAKE - ADULT  Goal: Nutrient intake appropriate for improving, restoring or maintaining nutritional needs  INTERVENTIONS:  - Assess nutritional status and recommend course of action  - Monitor oral intake, labs, and treatment pl
show

## (undated) NOTE — LETTER
Hospital Discharge Documentation  Please phone to schedule a hospital follow up appointment.     From: [address] Hospitalist's Office  Phone: [phone]    Patient discharged time/date: 11/16/2018  1:53 PM  Patient discharge disposition:  [address] Hydronephrosis     Abdominal wound dehiscence     Abdominal wound dehiscence, initial encounter     Brain metastasis (HCC)     Bone metastasis ([address]     Enterocutaneous fistula     Malignant neoplasm of urinary bladder (HCC)     Nausea & vomiting     Ac Pt currently in mod distress, c/o R hip pain.  No cp, sob, f,c,n,v, abd pain or groin pain.[RS.2]     Hospital Course:[RS.1]   R hip pain  IMPROVED  Due to metastases from bladder cancer  Palliative care, oncology and radiation oncology were consulted  Cont Take 1 tablet (8 mg total) by mouth every 2 (two) hours as needed for Pain. Please fill. For increasing cancer related pain.    Quantity:  30 tablet  Refills:  0        CONTINUE taking these medications      Instructions Prescription details   fentaNYL 50 RS.3 - Veronica Arcos MD on 11/16/2018  3:48 PM   RS. 4 - Veronica Arcos MD on 11/16/2018  3:51 PM

## (undated) NOTE — LETTER
Hospital Discharge Documentation  Please phone to schedule a hospital follow up appointment.     From: [address] Hospitalist's Office  Phone: [phone]    Patient discharged time/date: 12/10/2018  5:33 PM  Patient discharge disposition:  Home or Sarah Beth 6.1.  Started on IV fluids, sepsis protocol, and IV Zosyn. White blood cell count 2.1, hemoglobin 10.0 which is stable, platelet count 69. Chemistry showed BUN 44 and creatinine 2.04, estimated GFR 32 which is slightly below his baseline. Bicarb was 19. Temp:  [97.2 °F (36.2 °C)-97.8 °F (36.6 °C)] 97.8 °F (36.6 °C)  Pulse:  [83-98] 98  Resp:  [17-18] 18  BP: (118-136)/(50-64) 136/64    Gen: A+Ox3. No distress. HEENT: NCAT, neck supple, no carotid bruit. CV: RRR, S1S2, and intact distal pulses.  No gall sodium chloride 0.9 % SOLN 50 mL with CeFAZolin 1 g SOLR 1 g  Start taking on:  12/18/2018      Inject 1 g into the vein every 12 (twelve) hours for 13 days.  Weekly CBC/diff, CMP  ICD 10 R78.81   Stop taking on:  12/31/2018  Quantity:  1 Bag  Refills:  0 Shavon Stringer MD Consulting Physician  Cardiovascular Diseases    Jack Sequeira MD Consulting Physician  Calvin Ram MD Consulting Physician  SURGERY, GENERAL    [facility], Kimmy Coe MD Consulting Physician  INFECTIOUS DISEASES    Julius Zheng,

## (undated) NOTE — ED AVS SNAPSHOT
Williams Sheehan   MRN: [id number]    Department:  [facility] Emergency Department   Date of Visit:  5/6/2018           Disclosure     Insurance plans vary and the physician(s) referred by the ER may not be covered by your plan.  Please contact yo within the next three months to obtain basic health screening including reassessment of your blood pressure.     IF THERE IS ANY CHANGE OR WORSENING OF YOUR CONDITION, CALL YOUR PRIMARY CARE PHYSICIAN AT ONCE OR RETURN IMMEDIATELY TO THE EMERGENCY DEPARTMEN

## (undated) NOTE — ED AVS SNAPSHOT
Binta Salinas   MRN: [id number]    Department:  [facility] Emergency Department   Date of Visit:  5/5/2018           Disclosure     Insurance plans vary and the physician(s) referred by the ER may not be covered by your plan.  Please contact yo within the next three months to obtain basic health screening including reassessment of your blood pressure.     IF THERE IS ANY CHANGE OR WORSENING OF YOUR CONDITION, CALL YOUR PRIMARY CARE PHYSICIAN AT ONCE OR RETURN IMMEDIATELY TO THE EMERGENCY DEPARTMEN

## (undated) NOTE — LETTER
Hospital Discharge Documentation  Please phone to schedule a hospital follow up appointment.     From: [address] Hospitalist's Office  Phone: [phone]    Patient discharged time/date: 11/8/2018 12:55 PM  Patient discharge disposition:  Home or Self Pathological fracture in other disease, right femur, initial encounter for fracture [facility])     History of bladder cancer     Bone metastases [address]     Hydronephrosis     Abdominal wound dehiscence     Abdominal wound dehiscence, initial encounter     Bra N/v/diarrhea - c diff neg   - cont zofran for nausea   - cont imodium prn      Bladder ca with mets to brain and bone   - apprec pain service - started on fentanyl 50 and oral dilaudid 8 q 2 prn   - apprec onc consult - pt with anemia 2/2 chemo.  Received Refills:  3        STOP taking these medications    Morphine Sulfate ER 30 MG T12a        morphINE Sulfate IR 30 MG Tabs  Commonly known as:  MSIR              Where to Get Your Medications      Please  your prescriptions at the location directed by

## (undated) NOTE — LETTER
Hospital Discharge Documentation  Please phone to schedule a hospital follow up appointment. No discharge summary available at this time, below is the most recent progress note  for your review .         From: [address] Hospitalist's Office  Phone: [phone] 258 (H) 04/24/2018   BILT 1.1 04/24/2018   TP 7.6 04/24/2018   AST 35 04/24/2018   ALT 45 04/24/2018   PTT 28.1 03/27/2012   INR 1.0 03/27/2012   PT 12.7 03/27/2012   TSH 1.80 03/02/2018   PSA 0.0 07/01/2018   MG 2.0 04/04/2013         Xr Knee (1 Or ED to Hosp-Admission (Discharged) on 7/1/2018            Detailed Report

## (undated) NOTE — IP AVS SNAPSHOT
[facility]            (For Outpatient Use Only) Initial Admit Date: 8/14/2018   Inpt/Obs Admit Date: Inpt: 8/15/18 / Obs: N/A   Discharge Date:    Jorge Navarro:  [de-identified]   MRN: [de-identified]   CSN: [id number]        ENCOUNTER  Patient Class Subscriber Name:  Daniel De La Vega :    Subscriber ID:  Pt Rel to Subscriber:    Hospital Account Financial Class: Medicare    2018

## (undated) NOTE — ED AVS SNAPSHOT
Oleg Dee   MRN: [id number]    Department:  [facility] Emergency Department   Date of Visit:  6/23/2018           Disclosure     Insurance plans vary and the physician(s) referred by the ER may not be covered by your plan.  Please contact y within the next three months to obtain basic health screening including reassessment of your blood pressure.     IF THERE IS ANY CHANGE OR WORSENING OF YOUR CONDITION, CALL YOUR PRIMARY CARE PHYSICIAN AT ONCE OR RETURN IMMEDIATELY TO THE EMERGENCY DEPARTMEN

## (undated) NOTE — LETTER
Hospital Discharge Documentation  Pt was transferred to [facility] ( [facility], room 7026. Address provided was [address] [place] [address], [address] [place] [address] ).  No discharge summary available at this time, below is the most recent Piperacillin Sod-Tazobactam So (ZOSYN) 3.375 g in dextrose 5 % 100 mL ADD-vantage 3.375 g Intravenous Q8H   0.9 % NaCl 1000ml with KCl 20 mEq infusion   Intravenous Continuous            Lab Results  Component Value Date   WBC 2.9 (L) 08/16/2018   HGB 8.0 - Surgery consulted. apprec input      Acute on chronic kidney disease stage IV  Likely secondary dehydration, IV fluids initiated will continue to monitor closely.  Avoid nephrotoxic medication.   - creat improved today      Metabolic acidosis  Likely seco

## (undated) NOTE — IP AVS SNAPSHOT
Patient Demographics     Address  [place] [address] Phone  [phone] (Home) *Preferred*      Emergency Contact(s)     Name Relation Home Work Mobile    AyanGabriella franco Sister [phone]      Shavon Han Daughter [phone] [id number] Loperamide HCl (IMODIUM) cap 4 mg 08/16/18 0913 Given      [id number] Piperacillin Sod-Tazobactam So (ZOSYN) 3.375 g in dextrose 5 % 100 mL ADD-vantage 08/16/18 0129 New Bag      [id number] Piperacillin Sod-Tazobactam So (ZOSYN) 3.375 g in dextros Blood — 08/16/18 0526          Components    Component Value Reference Range Flag Lab   Potassium 3.8 3.3 - 5.1 mmol/L — [facility]            BASIC METABOLIC PANEL (8) [[id number]] (Abnormal)  Resulted: 08/16/18 0612, Result status: Final result   Orderi Potassium 3.7 3.3 - 5.1 mmol/L — [facility]            HEMOGLOBIN + HEMATOCRIT [[id number]] (Abnormal)  Resulted: 08/15/18 1913, Result status: Final result   Ordering provider:  Maryln Denver, DO  08/15/18 1842 Resulting lab:  [facility]    Specimen Patients surgery was at at the [facility] [place], currently awaiting transfer once bed available[SA. 2]    History:  Past Medical History:   Diagnosis Date   • Abscess 2013    s/p neobladder   • Anemia, iron deficiency 2008   • Sheppard's esophagus    • Cardiovascular: Negative  Gastrointestinal:  Negative. Genitourinary:  Negative  Endocrine:  Negative. Immunologic:  Negative. Musculoskeletal:  Negative. Integumentary:  Negative. Neurologic:  Negative. Psychiatric:  Negative.   TONIE reviewed as docum We will use colostomy bag for protection of surrounding skin. Wound care has been consulted, patient awaiting transfer to [facility] for further management.   Patient started on Zosyn and Diflucan at surgeon's [facility]) request.    Acute on chronic REQUESTING PHYSICIAN:  Dr. Maurice Eng. REASON FOR CONSULTATION:  Metastatic bladder cancer with bone and brain metastasis.      HISTORY OF PRESENT ILLNESS:  The patient is a pleasant 57-year-old male, never smoker, with metastatic bladder cancer wit  with exposure to [employer]. He denies alcohol or illicit drug use. REVIEW OF SYSTEMS:  Other review of systems negative x12. PHYSICAL EXAMINATION:    GENERAL:  No acute distress. Alert and oriented.    VITAL SIGNS:  ECOG performance agreement. We will arrange for the patient to be set up to be seen here at the [facility] once his postsurgical issues have resolved, and we will be in contact with him in the coming weeks.      Thank you very much for the consultation request.    Dic

## (undated) NOTE — LETTER
Hospital Discharge Documentation  Please phone to schedule a hospital follow up appointment. No discharge summary available at this time, below is the most recent progress note  for your review .         From: [address] Hospitalist's Office  Phone: [phone] [phone] (H) 04/24/2018   BILT 1.1 04/24/2018   TP 7.6 04/24/2018   AST 35 04/24/2018   ALT 45 04/24/2018   PTT 28.1 03/27/2012   INR 1.0 03/27/2012   PT 12.7 03/27/2012   TSH 1.80 03/02/2018   PSA 0.0 07/01/2018   MG 2.0 04/04/2013         Xr Knee (1 Or

## (undated) NOTE — LETTER
Hospital Discharge Documentation  Please phone to schedule a hospital follow up appointment.     From: [address] Hospitalist's Office  Phone: [phone]    Patient discharged time/date: 1/12/2019  5:13 PM  Patient discharge disposition:  [address] Abdominal wound dehiscence     Abdominal wound dehiscence, initial encounter     Brain metastasis (HCC)     Bone metastasis ([address]     Enterocutaneous fistula     Malignant neoplasm of urinary bladder (HCC)     Nausea & vomiting     Acute renal failure ( who was hospitalized around 1 month ago for sepsis.   Patient has been noted by family that he had change in skin color for the last 10 days at least.  Today, he saw Dr. Cheyenne Gamble and received pembrolizumab IV immunotherapy for his cancer, which was first dose, Chronic GI blood loss into fistula. Acute blood loss anemia.   - transfused 1 unit prbc  - heme/onc was on consult     Metastatic bladder cancer.    - heme/onc was on consult     dvt proph:   SCDs.  Heparin stopped due to GI blood loss and low plts.     Co Stop taking on:  2/11/2019  Quantity:  90 packet  Refills:  0        STOP taking these medications    CeFAZolin Sodium 1 g Solr  Commonly known as:  ANCEF        fentaNYL 50 MCG/HR Pt72  Commonly known as:  DURAGESIC              Where to Marya Motley 8054

## (undated) NOTE — ED AVS SNAPSHOT
[facility]   MRN: [id number]    Department:  [facility] Emergency Department   Date of Visit:  10/22/2018           Disclosure     Insurance plans vary and the physician(s) referred by the ER may not be covered by your plan.  Please contact within the next three months to obtain basic health screening including reassessment of your blood pressure.     IF THERE IS ANY CHANGE OR WORSENING OF YOUR CONDITION, CALL YOUR PRIMARY CARE PHYSICIAN AT ONCE OR RETURN IMMEDIATELY TO THE EMERGENCY DEPARTMEN

## (undated) NOTE — MR AVS SNAPSHOT
[facility]  [address] [place] [address]  [phone]               Thank you for choosing us for your health care visit with Maggie Matt MD.  We are glad to serve you and happy to provide you with this summary of your visit.   Shanti Hypertension, benign [I10]                 Reason for Today's Visit     Checkup           Medical Issues Discussed Today     Hypertension, benign    Malignant neoplasm of bladder    Malignant neoplasm of prostate    Renal stone      Instructions and Inform EAT THESE FOODS MORE OFTEN: EAT THESE FOODS LESS OFTEN:   Make half your plate fruits and vegetables Highly refined, white starches including white bread, rice and pasta   Eat plenty of protein, keep the fat content low Sugars:  sodas and sports drinks, ca

## (undated) NOTE — LETTER
Hospital Discharge Documentation  Please phone to schedule a hospital follow up appointment.     From: [address] Hospitalist's Office  Phone: [phone]    Patient discharged time/date: 11/20/2018  4:40 PM  Patient discharge disposition:  [facility] Abdominal wound dehiscence, initial encounter     Brain metastasis (HCC)     Bone metastasis ([address]     Enterocutaneous fistula     Malignant neoplasm of urinary bladder (HCC)     Nausea & vomiting     Acute renal failure (ARF) (HCC)     Chemotherapy fol Admitted for acute anemia and increased wound drainage. Wound vac was placed on 11/20 but patient wanted to go home later that day 2/2 to desire to have radiation on 11/21 which could not occur if patient was still inpatient.   Patient would prefer to get Quantity:  60 tablet  Refills:  3        STOP taking these medications    dexamethasone 4 MG tablet  Commonly known as:  DECADRON[LC.2]                   Lashell Shaw MD, MPH  11/20/2018  4:07 PM[LC.1]    Electronically signed by Heidy Davis MD on

## (undated) NOTE — LETTER
January 18, 2019          Paulo Ames  [address] [place] [id number]          Dear Baljit Odom:       The results from your recent tests ordered by Dr. Bertha Segura:     Notes recorded by Idania Grey MD on 1/17/2019 at 3:06 PM CST  No hepatitis C detected